# Patient Record
Sex: MALE | Race: WHITE | NOT HISPANIC OR LATINO | Employment: FULL TIME | URBAN - NONMETROPOLITAN AREA
[De-identification: names, ages, dates, MRNs, and addresses within clinical notes are randomized per-mention and may not be internally consistent; named-entity substitution may affect disease eponyms.]

---

## 2017-05-25 PROBLEM — N20.0 KIDNEY STONE: Status: ACTIVE | Noted: 2017-05-25

## 2017-05-25 PROBLEM — K21.9 GERD (GASTROESOPHAGEAL REFLUX DISEASE): Status: ACTIVE | Noted: 2017-05-25

## 2017-05-25 PROBLEM — R00.2 PALPITATIONS: Status: ACTIVE | Noted: 2017-05-25

## 2017-05-25 PROBLEM — Z95.5 H/O HEART ARTERY STENT: Status: ACTIVE | Noted: 2017-05-25

## 2017-05-25 PROBLEM — I25.10 CAD IN NATIVE ARTERY: Status: ACTIVE | Noted: 2017-05-25

## 2017-05-25 PROBLEM — I10 ESSENTIAL HYPERTENSION: Status: ACTIVE | Noted: 2017-05-25

## 2017-05-25 PROBLEM — E78.5 HYPERLIPIDEMIA, UNSPECIFIED: Status: ACTIVE | Noted: 2017-05-25

## 2017-05-25 RX ORDER — OMEPRAZOLE 20 MG/1
20 CAPSULE, DELAYED RELEASE ORAL DAILY
COMMUNITY

## 2017-05-25 RX ORDER — NITROGLYCERIN 0.4 MG/1
0.4 TABLET SUBLINGUAL
COMMUNITY
End: 2022-02-07 | Stop reason: SDUPTHER

## 2017-05-25 RX ORDER — SIMVASTATIN 80 MG
80 TABLET ORAL NIGHTLY
COMMUNITY
End: 2017-05-26

## 2017-05-25 RX ORDER — PRASUGREL 10 MG/1
10 TABLET, FILM COATED ORAL DAILY
COMMUNITY

## 2017-05-25 RX ORDER — LISINOPRIL 20 MG/1
20 TABLET ORAL 2 TIMES DAILY
COMMUNITY

## 2017-05-25 RX ORDER — ASPIRIN 81 MG/1
81 TABLET ORAL DAILY
COMMUNITY

## 2017-05-26 ENCOUNTER — OFFICE VISIT (OUTPATIENT)
Dept: CARDIOLOGY | Facility: CLINIC | Age: 52
End: 2017-05-26

## 2017-05-26 VITALS
HEART RATE: 59 BPM | WEIGHT: 243 LBS | BODY MASS INDEX: 32.91 KG/M2 | DIASTOLIC BLOOD PRESSURE: 80 MMHG | HEIGHT: 72 IN | SYSTOLIC BLOOD PRESSURE: 138 MMHG

## 2017-05-26 DIAGNOSIS — I25.10 CAD IN NATIVE ARTERY: Primary | ICD-10-CM

## 2017-05-26 DIAGNOSIS — I10 ESSENTIAL HYPERTENSION: ICD-10-CM

## 2017-05-26 PROCEDURE — 93000 ELECTROCARDIOGRAM COMPLETE: CPT | Performed by: INTERNAL MEDICINE

## 2017-05-26 PROCEDURE — 99213 OFFICE O/P EST LOW 20 MIN: CPT | Performed by: INTERNAL MEDICINE

## 2017-05-26 RX ORDER — BUSPIRONE HYDROCHLORIDE 15 MG/1
15 TABLET ORAL DAILY
COMMUNITY

## 2017-05-26 RX ORDER — FLUOXETINE HYDROCHLORIDE 40 MG/1
40 CAPSULE ORAL DAILY
COMMUNITY

## 2017-05-26 RX ORDER — ATORVASTATIN CALCIUM 80 MG/1
80 TABLET, FILM COATED ORAL DAILY
Qty: 30 TABLET | Refills: 11 | Status: SHIPPED | OUTPATIENT
Start: 2017-05-26 | End: 2018-05-02 | Stop reason: SDUPTHER

## 2017-05-26 RX ORDER — LOSARTAN POTASSIUM 50 MG/1
50 TABLET ORAL DAILY
COMMUNITY

## 2018-02-05 ENCOUNTER — TELEPHONE (OUTPATIENT)
Dept: CARDIOLOGY | Facility: CLINIC | Age: 53
End: 2018-02-05

## 2018-02-05 NOTE — TELEPHONE ENCOUNTER
PATIENT IS HAVING A DENTAL PROCEDURE AND IS NEEDING A RISK ASSESSMENT FOR BLOOD THINNER.  LOV=05/26/17

## 2018-05-02 DIAGNOSIS — I25.10 CAD IN NATIVE ARTERY: ICD-10-CM

## 2018-05-02 RX ORDER — ATORVASTATIN CALCIUM 80 MG/1
TABLET, FILM COATED ORAL
Qty: 30 TABLET | Refills: 0 | Status: SHIPPED | OUTPATIENT
Start: 2018-05-02 | End: 2018-06-15 | Stop reason: SDUPTHER

## 2018-06-15 DIAGNOSIS — I25.10 CAD IN NATIVE ARTERY: ICD-10-CM

## 2018-06-15 RX ORDER — ATORVASTATIN CALCIUM 80 MG/1
TABLET, FILM COATED ORAL
Qty: 30 TABLET | Refills: 5 | Status: SHIPPED | OUTPATIENT
Start: 2018-06-15 | End: 2018-11-26 | Stop reason: SDUPTHER

## 2018-11-26 DIAGNOSIS — I25.10 CAD IN NATIVE ARTERY: ICD-10-CM

## 2018-11-27 RX ORDER — ATORVASTATIN CALCIUM 80 MG/1
TABLET, FILM COATED ORAL
Qty: 30 TABLET | Refills: 11 | Status: SHIPPED | OUTPATIENT
Start: 2018-11-27 | End: 2021-12-07 | Stop reason: SDUPTHER

## 2021-12-07 ENCOUNTER — OFFICE VISIT (OUTPATIENT)
Dept: CARDIOLOGY | Facility: CLINIC | Age: 56
End: 2021-12-07

## 2021-12-07 VITALS
WEIGHT: 235 LBS | HEIGHT: 72 IN | DIASTOLIC BLOOD PRESSURE: 77 MMHG | SYSTOLIC BLOOD PRESSURE: 138 MMHG | BODY MASS INDEX: 31.83 KG/M2 | HEART RATE: 57 BPM

## 2021-12-07 DIAGNOSIS — R94.39 POSITIVE CARDIAC STRESS TEST: ICD-10-CM

## 2021-12-07 DIAGNOSIS — I10 ESSENTIAL HYPERTENSION: ICD-10-CM

## 2021-12-07 DIAGNOSIS — E78.5 HYPERLIPIDEMIA, UNSPECIFIED HYPERLIPIDEMIA TYPE: ICD-10-CM

## 2021-12-07 DIAGNOSIS — I25.10 CAD IN NATIVE ARTERY: ICD-10-CM

## 2021-12-07 DIAGNOSIS — I20.9 ANGINAL PAIN (HCC): Primary | ICD-10-CM

## 2021-12-07 DIAGNOSIS — R94.39 POSITIVE CARDIAC STRESS TEST: Primary | ICD-10-CM

## 2021-12-07 PROCEDURE — 93000 ELECTROCARDIOGRAM COMPLETE: CPT | Performed by: INTERNAL MEDICINE

## 2021-12-07 PROCEDURE — 99204 OFFICE O/P NEW MOD 45 MIN: CPT | Performed by: INTERNAL MEDICINE

## 2021-12-07 RX ORDER — ATORVASTATIN CALCIUM 80 MG/1
80 TABLET, FILM COATED ORAL DAILY
Qty: 90 TABLET | Refills: 3 | Status: SHIPPED | OUTPATIENT
Start: 2021-12-07 | End: 2022-02-07 | Stop reason: SINTOL

## 2021-12-07 RX ORDER — LORAZEPAM 2 MG/ML
1 INJECTION INTRAMUSCULAR ONCE
Status: CANCELLED | OUTPATIENT
Start: 2021-12-07 | End: 2021-12-07

## 2021-12-07 RX ORDER — NITROGLYCERIN 0.4 MG/1
0.4 TABLET SUBLINGUAL
Status: CANCELLED | OUTPATIENT
Start: 2021-12-07

## 2021-12-07 RX ORDER — ONDANSETRON 2 MG/ML
4 INJECTION INTRAMUSCULAR; INTRAVENOUS EVERY 6 HOURS PRN
Status: CANCELLED | OUTPATIENT
Start: 2021-12-07

## 2021-12-07 RX ORDER — SODIUM CHLORIDE 9 MG/ML
1-3 INJECTION, SOLUTION INTRAVENOUS CONTINUOUS
Status: CANCELLED | OUTPATIENT
Start: 2021-12-07

## 2021-12-07 RX ORDER — LIDOCAINE HYDROCHLORIDE 10 MG/ML
0.1 INJECTION, SOLUTION EPIDURAL; INFILTRATION; INTRACAUDAL; PERINEURAL ONCE AS NEEDED
Status: CANCELLED | OUTPATIENT
Start: 2021-12-07

## 2021-12-07 RX ORDER — NITROGLYCERIN 0.4 MG/1
TABLET SUBLINGUAL
Qty: 100 TABLET | Refills: 11 | Status: SHIPPED | OUTPATIENT
Start: 2021-12-07

## 2021-12-07 NOTE — PROGRESS NOTES
"Rubens Bagley is a 56 y.o. male. Referred by pcp for cp and Pos Stress nuc 8/17/21    History of Present Illness     CHEST PAIN:  This started 8/21 and was noted when walking up stairs at work. 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 In 8/21 he went to the [facility] ER with sscp and had a neg eval for ACS. He was referred for a stress NUC test and got into st 4 but has some infero-lateral ST depression and the Nuc scan showed inferior ischemia. He was scheduled to see Dr Strauss but contracted COVID and cancelled the pari. Recently he saw his pcp and was informed of the pos stress test and referred here as he has been here in the past. Since 8/31, he has had sscp only with walking stairs at work and he has learned how to \"pace\" himself to avoid cp. EKG today is [facility] ct 11/13/16.    HX LAD AND CX STENTING ('09 and '12):  His cp now is similar to the cp prior to stenting. \"I figure I've got a worse blockage now\".    HLD:  In the past he was on high-potent statin tx but has been off that for years  - \"the script was just never filled for some reason\". He has not had a recent lipid check.        The following portions of the patient's history were reviewed and updated as appropriate: allergies, current medications, past family history, past medical history, past social history, past surgical history and problem list.    Patient Active Problem List   Diagnosis   • Palpitations   • CAD in native artery   • Essential hypertension   • H/O heart artery stent   • GERD (gastroesophageal reflux disease)   • Kidney stone   • Hyperlipidemia, unspecified   • Positive cardiac stress test   • Anginal pain (HCC)       No Known Allergies    Family History   Problem Relation Age of Onset   • Heart attack Father    • Heart disease Father    • Cardiomyopathy Father    • Hypertension Sister    • No Known Problems Mother        Social History     Socioeconomic History   • Marital status:    Tobacco " Use   • Smoking status: Former Smoker     Quit date:      Years since quittin.9   • Smokeless tobacco: Former User     Quit date:    Substance and Sexual Activity   • Alcohol use: No   • Drug use: No         Current Outpatient Medications:   •  aspirin 81 MG EC tablet, Take 81 mg by mouth Daily., Disp: , Rfl:   •  busPIRone (BUSPAR) 15 MG tablet, Take 15 mg by mouth Daily., Disp: , Rfl:   •  FLUoxetine (PROZAC) 40 MG capsule, Take 40 mg by mouth Daily., Disp: , Rfl:   •  lisinopril (PRINIVIL,ZESTRIL) 10 MG tablet, Take 20 mg by mouth Daily., Disp: , Rfl:   •  losartan (COZAAR) 50 MG tablet, Take 50 mg by mouth Daily., Disp: , Rfl:   •  metoprolol tartrate (LOPRESSOR) 25 MG tablet, Take 25 mg by mouth 2 (Two) Times a Day., Disp: , Rfl:   •  nitroglycerin (NITROSTAT) 0.4 MG SL tablet, Place 0.4 mg under the tongue Every 5 (Five) Minutes As Needed for Chest Pain. Take no more than 3 doses in 15 minutes., Disp: , Rfl:   •  omeprazole (priLOSEC) 20 MG capsule, Take 20 mg by mouth Daily., Disp: , Rfl:   •  prasugrel (EFFIENT) 10 MG tablet, Take 10 mg by mouth Daily., Disp: , Rfl:   •  atorvastatin (LIPITOR) 80 MG tablet, Take 1 tablet by mouth Daily., Disp: 90 tablet, Rfl: 3  •  nitroglycerin (NITROSTAT) 0.4 MG SL tablet, 1 under the tongue as needed for angina, may repeat q5mins for up three doses, Disp: 100 tablet, Rfl: 11    Past Surgical History:   Procedure Laterality Date   • ANGIOPLASTY      with stents x 2   • CARDIAC CATHETERIZATION     • CORONARY STENT PLACEMENT     • OTHER SURGICAL HISTORY      Lithotripsy   • TONSILLECTOMY         Review of Systems   Constitutional: Positive for activity change and fatigue. Negative for appetite change and unexpected weight change.   Respiratory: Negative for shortness of breath.    Cardiovascular: Positive for chest pain. Negative for palpitations and leg swelling.   Gastrointestinal: Negative for abdominal pain.   Genitourinary: Negative for difficulty  "urinating.       /77   Pulse 57   Ht 182.9 cm (72\")   Wt 107 kg (235 lb)   BMI 31.87 kg/m²   Procedures    Objective   Physical Exam  Constitutional:       Appearance: Normal appearance. He is not ill-appearing.   Cardiovascular:      Rate and Rhythm: Normal rate and regular rhythm.      Pulses: Normal pulses.      Heart sounds: Normal heart sounds. No murmur heard.  No friction rub. No gallop.    Pulmonary:      Effort: Pulmonary effort is normal.      Breath sounds: Normal breath sounds. No wheezing or rales.   Abdominal:      General: Bowel sounds are normal.      Tenderness: There is no abdominal tenderness.   Musculoskeletal:      Right lower leg: No edema.      Left lower leg: No edema.   Skin:     General: Skin is warm and dry.   Neurological:      General: No focal deficit present.      Mental Status: He is oriented to person, place, and time.   Psychiatric:         Mood and Affect: Mood normal.         Behavior: Behavior normal.         Assessment/Plan   Diagnoses and all orders for this visit:    1. Anginal pain (HCC) (Primary)  Comments:  prn NTG and schedule cardiac cath - he desires after holidays    2. CAD in native artery  -     ECG 12 Lead  -     atorvastatin (LIPITOR) 80 MG tablet; Take 1 tablet by mouth Daily.  Dispense: 90 tablet; Refill: 3    3. Essential hypertension  Comments:  controlled    4. Hyperlipidemia, unspecified hyperlipidemia type  Comments:  restart statin    5. CAD in native artery  Comments:  no angina  Orders:  -     ECG 12 Lead  -     atorvastatin (LIPITOR) 80 MG tablet; Take 1 tablet by mouth Daily.  Dispense: 90 tablet; Refill: 3    6. Positive cardiac stress test  -     Case Request Cath Lab: Cardiac Catheterization/Vascular Study  -     nitroglycerin (NITROSTAT) 0.4 MG SL tablet; 1 under the tongue as needed for angina, may repeat q5mins for up three doses  Dispense: 100 tablet; Refill: 11                 Return for Next scheduled follow up TBD.  Orders Placed This " Encounter   Procedures   • ECG 12 Lead     Order Specific Question:   Reason for Exam:     Answer:   POSITIVE STRESS TEST     Order Specific Question:   Release to patient     Answer:   Immediate

## 2022-01-05 ENCOUNTER — HOSPITAL ENCOUNTER (OUTPATIENT)
Facility: HOSPITAL | Age: 57
Setting detail: HOSPITAL OUTPATIENT SURGERY
Discharge: HOME | End: 2022-01-05
Attending: INTERNAL MEDICINE | Admitting: INTERNAL MEDICINE

## 2022-01-05 VITALS
HEIGHT: 71 IN | HEART RATE: 62 BPM | TEMPERATURE: 98.2 F | SYSTOLIC BLOOD PRESSURE: 117 MMHG | WEIGHT: 228 LBS | OXYGEN SATURATION: 95 % | DIASTOLIC BLOOD PRESSURE: 84 MMHG | BODY MASS INDEX: 31.92 KG/M2 | RESPIRATION RATE: 16 BRPM

## 2022-01-05 DIAGNOSIS — R94.39 POSITIVE CARDIAC STRESS TEST: ICD-10-CM

## 2022-01-05 DIAGNOSIS — R00.2 PALPITATIONS: Primary | ICD-10-CM

## 2022-01-05 LAB
ALBUMIN SERPL-MCNC: 4.6 G/DL (ref 3.5–5.2)
ALBUMIN/GLOB SERPL: 1.6 G/DL
ALP SERPL-CCNC: 107 U/L (ref 39–117)
ALT SERPL W P-5'-P-CCNC: 24 U/L (ref 1–41)
ANION GAP SERPL CALCULATED.3IONS-SCNC: 8 MMOL/L (ref 5–15)
AST SERPL-CCNC: 21 U/L (ref 1–40)
BASOPHILS # BLD AUTO: 0.04 10*3/MM3 (ref 0–0.2)
BASOPHILS NFR BLD AUTO: 0.5 % (ref 0–1.5)
BILIRUB SERPL-MCNC: 0.4 MG/DL (ref 0–1.2)
BUN SERPL-MCNC: 19 MG/DL (ref 6–20)
BUN/CREAT SERPL: 18.4 (ref 7–25)
CALCIUM SPEC-SCNC: 9.6 MG/DL (ref 8.6–10.5)
CHLORIDE SERPL-SCNC: 108 MMOL/L (ref 98–107)
CO2 SERPL-SCNC: 26 MMOL/L (ref 22–29)
CREAT SERPL-MCNC: 1.03 MG/DL (ref 0.76–1.27)
DEPRECATED RDW RBC AUTO: 43.1 FL (ref 37–54)
EOSINOPHIL # BLD AUTO: 0.46 10*3/MM3 (ref 0–0.4)
EOSINOPHIL NFR BLD AUTO: 6.1 % (ref 0.3–6.2)
ERYTHROCYTE [DISTWIDTH] IN BLOOD BY AUTOMATED COUNT: 13.3 % (ref 12.3–15.4)
GFR SERPL CREATININE-BSD FRML MDRD: 74 ML/MIN/1.73
GLOBULIN UR ELPH-MCNC: 2.8 GM/DL
GLUCOSE SERPL-MCNC: 104 MG/DL (ref 65–99)
HCT VFR BLD AUTO: 44 % (ref 37.5–51)
HGB BLD-MCNC: 14 G/DL (ref 13–17.7)
IMM GRANULOCYTES # BLD AUTO: 0.02 10*3/MM3 (ref 0–0.05)
IMM GRANULOCYTES NFR BLD AUTO: 0.3 % (ref 0–0.5)
LYMPHOCYTES # BLD AUTO: 2.24 10*3/MM3 (ref 0.7–3.1)
LYMPHOCYTES NFR BLD AUTO: 29.5 % (ref 19.6–45.3)
MCH RBC QN AUTO: 28.3 PG (ref 26.6–33)
MCHC RBC AUTO-ENTMCNC: 31.8 G/DL (ref 31.5–35.7)
MCV RBC AUTO: 88.9 FL (ref 79–97)
MONOCYTES # BLD AUTO: 0.65 10*3/MM3 (ref 0.1–0.9)
MONOCYTES NFR BLD AUTO: 8.6 % (ref 5–12)
NEUTROPHILS NFR BLD AUTO: 4.18 10*3/MM3 (ref 1.7–7)
NEUTROPHILS NFR BLD AUTO: 55 % (ref 42.7–76)
NRBC BLD AUTO-RTO: 0 /100 WBC (ref 0–0.2)
PLATELET # BLD AUTO: 212 10*3/MM3 (ref 140–450)
PMV BLD AUTO: 10.7 FL (ref 6–12)
POTASSIUM SERPL-SCNC: 4.3 MMOL/L (ref 3.5–5.2)
PROT SERPL-MCNC: 7.4 G/DL (ref 6–8.5)
RBC # BLD AUTO: 4.95 10*6/MM3 (ref 4.14–5.8)
SODIUM SERPL-SCNC: 142 MMOL/L (ref 136–145)
WBC NRBC COR # BLD: 7.59 10*3/MM3 (ref 3.4–10.8)

## 2022-01-05 PROCEDURE — 93458 L HRT ARTERY/VENTRICLE ANGIO: CPT | Performed by: INTERNAL MEDICINE

## 2022-01-05 PROCEDURE — 25010000002 DIPHENHYDRAMINE PER 50 MG: Performed by: INTERNAL MEDICINE

## 2022-01-05 PROCEDURE — L1830 KO IMMOB CANVAS LONG PRE OTS: HCPCS | Performed by: INTERNAL MEDICINE

## 2022-01-05 PROCEDURE — 99152 MOD SED SAME PHYS/QHP 5/>YRS: CPT | Performed by: INTERNAL MEDICINE

## 2022-01-05 PROCEDURE — 25010000002 FENTANYL CITRATE (PF) 50 MCG/ML SOLUTION: Performed by: INTERNAL MEDICINE

## 2022-01-05 PROCEDURE — C1894 INTRO/SHEATH, NON-LASER: HCPCS | Performed by: INTERNAL MEDICINE

## 2022-01-05 PROCEDURE — 80053 COMPREHEN METABOLIC PANEL: CPT | Performed by: INTERNAL MEDICINE

## 2022-01-05 PROCEDURE — 0 IOPAMIDOL PER 1 ML: Performed by: INTERNAL MEDICINE

## 2022-01-05 PROCEDURE — 85025 COMPLETE CBC W/AUTO DIFF WBC: CPT | Performed by: INTERNAL MEDICINE

## 2022-01-05 PROCEDURE — C1769 GUIDE WIRE: HCPCS | Performed by: INTERNAL MEDICINE

## 2022-01-05 PROCEDURE — 25010000002 HEPARIN (PORCINE) 2000-0.9 UNIT/L-% SOLUTION: Performed by: INTERNAL MEDICINE

## 2022-01-05 PROCEDURE — 99153 MOD SED SAME PHYS/QHP EA: CPT | Performed by: INTERNAL MEDICINE

## 2022-01-05 PROCEDURE — 25010000002 MIDAZOLAM PER 1 MG: Performed by: INTERNAL MEDICINE

## 2022-01-05 PROCEDURE — 25010000002 HEPARIN (PORCINE) 1000-0.9 UT/500ML-% SOLUTION: Performed by: INTERNAL MEDICINE

## 2022-01-05 RX ORDER — SODIUM CHLORIDE 9 MG/ML
1-3 INJECTION, SOLUTION INTRAVENOUS CONTINUOUS
Status: DISCONTINUED | OUTPATIENT
Start: 2022-01-05 | End: 2022-01-05 | Stop reason: HOSPADM

## 2022-01-05 RX ORDER — HEPARIN SODIUM 200 [USP'U]/100ML
INJECTION, SOLUTION INTRAVENOUS AS NEEDED
Status: DISCONTINUED | OUTPATIENT
Start: 2022-01-05 | End: 2022-01-05 | Stop reason: HOSPADM

## 2022-01-05 RX ORDER — DIPHENHYDRAMINE HCL 25 MG
25 CAPSULE ORAL EVERY 6 HOURS PRN
Status: DISCONTINUED | OUTPATIENT
Start: 2022-01-05 | End: 2022-01-05 | Stop reason: HOSPADM

## 2022-01-05 RX ORDER — CALCIUM CARBONATE 200(500)MG
2 TABLET,CHEWABLE ORAL 2 TIMES DAILY PRN
Status: DISCONTINUED | OUTPATIENT
Start: 2022-01-05 | End: 2022-01-05 | Stop reason: HOSPADM

## 2022-01-05 RX ORDER — ONDANSETRON 4 MG/1
4 TABLET, FILM COATED ORAL EVERY 6 HOURS PRN
Status: DISCONTINUED | OUTPATIENT
Start: 2022-01-05 | End: 2022-01-05 | Stop reason: HOSPADM

## 2022-01-05 RX ORDER — MIDAZOLAM HYDROCHLORIDE 1 MG/ML
INJECTION INTRAMUSCULAR; INTRAVENOUS AS NEEDED
Status: DISCONTINUED | OUTPATIENT
Start: 2022-01-05 | End: 2022-01-05 | Stop reason: HOSPADM

## 2022-01-05 RX ORDER — LIDOCAINE HYDROCHLORIDE 20 MG/ML
INJECTION, SOLUTION INFILTRATION; PERINEURAL AS NEEDED
Status: DISCONTINUED | OUTPATIENT
Start: 2022-01-05 | End: 2022-01-05 | Stop reason: HOSPADM

## 2022-01-05 RX ORDER — FENTANYL CITRATE 50 UG/ML
INJECTION, SOLUTION INTRAMUSCULAR; INTRAVENOUS AS NEEDED
Status: DISCONTINUED | OUTPATIENT
Start: 2022-01-05 | End: 2022-01-05 | Stop reason: HOSPADM

## 2022-01-05 RX ORDER — SODIUM CHLORIDE 9 MG/ML
100 INJECTION, SOLUTION INTRAVENOUS CONTINUOUS
Status: DISCONTINUED | OUTPATIENT
Start: 2022-01-05 | End: 2022-01-05 | Stop reason: HOSPADM

## 2022-01-05 RX ORDER — DIPHENHYDRAMINE HYDROCHLORIDE 50 MG/ML
INJECTION INTRAMUSCULAR; INTRAVENOUS AS NEEDED
Status: DISCONTINUED | OUTPATIENT
Start: 2022-01-05 | End: 2022-01-05 | Stop reason: HOSPADM

## 2022-01-05 RX ORDER — ACETAMINOPHEN 325 MG/1
650 TABLET ORAL EVERY 4 HOURS PRN
Status: DISCONTINUED | OUTPATIENT
Start: 2022-01-05 | End: 2022-01-05 | Stop reason: HOSPADM

## 2022-01-05 RX ORDER — ASPIRIN 81 MG/1
81 TABLET ORAL DAILY
Status: DISCONTINUED | OUTPATIENT
Start: 2022-01-05 | End: 2022-01-05 | Stop reason: HOSPADM

## 2022-01-05 RX ORDER — SODIUM CHLORIDE 9 MG/ML
1 INJECTION, SOLUTION INTRAVENOUS CONTINUOUS
Status: DISCONTINUED | OUTPATIENT
Start: 2022-01-05 | End: 2022-01-05 | Stop reason: HOSPADM

## 2022-01-05 RX ORDER — ONDANSETRON 2 MG/ML
4 INJECTION INTRAMUSCULAR; INTRAVENOUS EVERY 6 HOURS PRN
Status: DISCONTINUED | OUTPATIENT
Start: 2022-01-05 | End: 2022-01-05 | Stop reason: HOSPADM

## 2022-01-05 RX ADMIN — SODIUM CHLORIDE 1 ML/KG/HR: 9 INJECTION, SOLUTION INTRAVENOUS at 12:08

## 2022-01-10 ENCOUNTER — APPOINTMENT (OUTPATIENT)
Dept: CARDIOLOGY | Facility: HOSPITAL | Age: 57
End: 2022-01-10

## 2022-01-11 ENCOUNTER — HOSPITAL ENCOUNTER (OUTPATIENT)
Dept: CARDIOLOGY | Facility: HOSPITAL | Age: 57
Discharge: HOME | End: 2022-01-11
Admitting: INTERNAL MEDICINE

## 2022-01-11 DIAGNOSIS — R00.2 PALPITATIONS: ICD-10-CM

## 2022-01-11 PROCEDURE — 93225 XTRNL ECG REC<48 HRS REC: CPT

## 2022-01-15 LAB
MAXIMAL PREDICTED HEART RATE: 162 BPM
STRESS TARGET HR: 138 BPM

## 2022-01-15 PROCEDURE — 93227 XTRNL ECG REC<48 HR R&I: CPT | Performed by: INTERNAL MEDICINE

## 2022-02-07 ENCOUNTER — OFFICE VISIT (OUTPATIENT)
Dept: CARDIOLOGY | Facility: CLINIC | Age: 57
End: 2022-02-07

## 2022-02-07 VITALS
WEIGHT: 230 LBS | SYSTOLIC BLOOD PRESSURE: 125 MMHG | BODY MASS INDEX: 32.2 KG/M2 | HEIGHT: 71 IN | OXYGEN SATURATION: 99 % | HEART RATE: 60 BPM | DIASTOLIC BLOOD PRESSURE: 80 MMHG | RESPIRATION RATE: 18 BRPM

## 2022-02-07 DIAGNOSIS — E78.5 HYPERLIPIDEMIA, UNSPECIFIED HYPERLIPIDEMIA TYPE: ICD-10-CM

## 2022-02-07 DIAGNOSIS — I10 ESSENTIAL HYPERTENSION: ICD-10-CM

## 2022-02-07 DIAGNOSIS — I49.3 PVC'S (PREMATURE VENTRICULAR CONTRACTIONS): Primary | ICD-10-CM

## 2022-02-07 DIAGNOSIS — I25.10 CAD IN NATIVE ARTERY: ICD-10-CM

## 2022-02-07 DIAGNOSIS — R00.2 PALPITATIONS: ICD-10-CM

## 2022-02-07 PROCEDURE — 99214 OFFICE O/P EST MOD 30 MIN: CPT | Performed by: NURSE PRACTITIONER

## 2022-02-07 RX ORDER — ATORVASTATIN CALCIUM 80 MG/1
80 TABLET, FILM COATED ORAL DAILY
Qty: 90 TABLET | Refills: 3 | Status: SHIPPED | OUTPATIENT
Start: 2022-02-07 | End: 2022-11-14

## 2022-02-07 RX ORDER — SIMVASTATIN 80 MG
80 TABLET ORAL NIGHTLY
COMMUNITY
End: 2022-02-07

## 2022-02-07 NOTE — PROGRESS NOTES
Subjective:     Encounter Date:02/07/2022      Patient ID: Jerry Bagley is a 56 y.o. male     Chief Complaint:  History of Present Illness  Patient presents today for follow up for chest pain and palpitations. Patient underwent heart cath for angina. Heart cath showed patent stent in proximal   LAD and no obstructive coronary artery disease. He did wear a holter monitor due to palpitations which showed roughly 10% PVCs. He notes he has daily palpitations. He notes he was to the understanding he would be referred to Dr. Soriano for this issue but has not been notified of appointment. Patient has hypertension, hyperlipidemia and GERD. He follows with Emily SANTOS for his primary care needs. He was started on Lipitor 80 at last OV but now is not on this medication- he is unclear as to why.     The following portions of the patient's history were reviewed and updated as appropriate: allergies, current medications, past medical history, past social history, past and problem list.    No Known Allergies    Current Outpatient Medications:   •  aspirin 81 MG EC tablet, Take 81 mg by mouth Daily., Disp: , Rfl:   •  busPIRone (BUSPAR) 15 MG tablet, Take 15 mg by mouth Daily., Disp: , Rfl:   •  FLUoxetine (PROZAC) 40 MG capsule, Take 40 mg by mouth Daily., Disp: , Rfl:   •  lisinopril (PRINIVIL,ZESTRIL) 20 MG tablet, Take 20 mg by mouth Daily., Disp: , Rfl:   •  losartan (COZAAR) 50 MG tablet, Take 50 mg by mouth Daily., Disp: , Rfl:   •  metoprolol tartrate (LOPRESSOR) 25 MG tablet, Take 25 mg by mouth 2 (Two) Times a Day., Disp: , Rfl:   •  nitroglycerin (NITROSTAT) 0.4 MG SL tablet, 1 under the tongue as needed for angina, may repeat q5mins for up three doses, Disp: 100 tablet, Rfl: 11  •  omeprazole (priLOSEC) 20 MG capsule, Take 20 mg by mouth Daily., Disp: , Rfl:   •  prasugrel (EFFIENT) 10 MG tablet, Take 10 mg by mouth Daily., Disp: , Rfl:   •  atorvastatin (LIPITOR) 80 MG tablet, Take 1 tablet by mouth Daily., Disp:  90 tablet, Rfl: 3    Social History     Socioeconomic History   • Marital status:    Tobacco Use   • Smoking status: Former Smoker     Quit date: 2007     Years since quitting: 15.1   • Smokeless tobacco: Former User     Quit date: 2007   Substance and Sexual Activity   • Alcohol use: No   • Drug use: No       Review of Systems   Constitutional: Positive for malaise/fatigue. Negative for chills, decreased appetite, fever, weight gain and weight loss.   HENT: Negative for nosebleeds.    Eyes: Negative for visual disturbance.   Cardiovascular: Positive for palpitations. Negative for chest pain, dyspnea on exertion, leg swelling, near-syncope, orthopnea, paroxysmal nocturnal dyspnea and syncope.   Respiratory: Negative for cough, hemoptysis, shortness of breath and snoring.    Endocrine: Negative for cold intolerance and heat intolerance.   Hematologic/Lymphatic: Negative for bleeding problem. Does not bruise/bleed easily.   Skin: Negative for rash.   Musculoskeletal: Negative for back pain and falls.   Gastrointestinal: Negative for abdominal pain, constipation, diarrhea, heartburn, melena, nausea and vomiting.   Genitourinary: Negative for hematuria.   Neurological: Negative for dizziness, headaches and light-headedness.   Psychiatric/Behavioral: Negative for altered mental status.   Allergic/Immunologic: Negative for persistent infections.              Objective:     Constitutional:       Appearance: Healthy appearance. Well-developed and not in distress.   Eyes:      Pupils: Pupils are equal, round, and reactive to light.   HENT:      Head: Normocephalic and atraumatic.   Neck:      Vascular: No carotid bruit or JVD.   Pulmonary:      Effort: Pulmonary effort is normal.      Breath sounds: Normal breath sounds.   Cardiovascular:      Normal rate. Regular rhythm.   Pulses:     Intact distal pulses.   Edema:     Peripheral edema absent.   Abdominal:      General: Bowel sounds are normal.      Palpations:  "Abdomen is soft.   Musculoskeletal: Normal range of motion.      Cervical back: Normal range of motion and neck supple. Skin:     General: Skin is warm and dry.   Neurological:      Mental Status: Alert and oriented to person, place, and time.      Deep Tendon Reflexes: Reflexes are normal and symmetric.   Psychiatric:         Behavior: Behavior normal.         Thought Content: Thought content normal.         Judgment: Judgment normal.           Procedures  /80 (BP Location: Right arm, Patient Position: Sitting, Cuff Size: Adult)   Pulse 60   Resp 18   Ht 180.3 cm (71\")   Wt 104 kg (230 lb)   SpO2 99%   BMI 32.08 kg/m²   Lab Review:   I have reviewed   Heart cath  Holter Monitor       No results found for: CHOL, CHLPL, TRIG, HDL, LDL, LDLDIRECT   Results for orders placed in visit on 01/19/16    SCANNED - ECHOCARDIOGRAM     Assessment:          Diagnosis Plan   1. PVC's (premature ventricular contractions)  Ambulatory Referral to Cardiac Electrophysiology   2. Palpitations     3. CAD in native artery     4. Essential hypertension     5. Hyperlipidemia, unspecified hyperlipidemia type     6. BMI 32.0-32.9,adult            Plan:       1. PVCs - 10.9% burden on  Monitor. Daily palpitations. No associated symptoms. On Lopressor. Will refer to electrophysiology per Dr. Dee  2. Palpitations - chronic. Occur daily. On Lopressor. Refer to EP  3. Coronary Artery Disease - Patent stent on recent cath. Chest pain has resolved. Needs high intensity statin ( Lipitor or Crestor). Will send in Lipitor 80 as Dr. Dee did before. On Effient and Aspirin.   4. Hypertension - blood pressure stable and controlled.   5. Hyperlipidemia - goal LDL less than 80. Have requested lipid panel. Given CAD needs to be in high intensity statin.   6. Patient's Body mass index is 32.08 kg/m². indicating that he is obese (BMI >30). Obesity-related health conditions include the following: hypertension, coronary heart disease and " dyslipidemias. Obesity is unchanged. BMI is is above average; no BMI management plan is appropriate. We discussed portion control and increasing exercise..           I spent 32 minutes caring for Jerry on this date of service. This time includes time spent by me in the following activities:preparing for the visit, reviewing tests, obtaining and/or reviewing a separately obtained history, performing a medically appropriate examination and/or evaluation , counseling and educating the patient/family/caregiver, ordering medications, tests, or procedures, referring and communicating with other health care professionals , documenting information in the medical record, independently interpreting results and communicating that information with the patient/family/caregiver and care coordination

## 2022-02-17 DIAGNOSIS — E78.5 HYPERLIPIDEMIA, UNSPECIFIED HYPERLIPIDEMIA TYPE: Primary | ICD-10-CM

## 2022-02-17 DIAGNOSIS — I25.10 CAD IN NATIVE ARTERY: ICD-10-CM

## 2022-03-21 PROBLEM — R94.39 POSITIVE CARDIAC STRESS TEST: Status: RESOLVED | Noted: 2021-12-07 | Resolved: 2022-03-21

## 2022-03-21 PROCEDURE — 93000 ELECTROCARDIOGRAM COMPLETE: CPT | Performed by: INTERNAL MEDICINE

## 2022-03-21 NOTE — PROGRESS NOTES
"        Cardiac Electrophysiology Outpatient Consult Note            [facility]    Consult Note     Jerry Bagley  [id number]  03/22/2022  [phone]     Primary Care Physician: Emily Lozano APRN    Referred By: Jostin Skaggs APRN    Subjective     Chief Complaint:   Diagnoses and all orders for this visit:    1. Palpitations (Primary)  -     ECG 12 Lead      Chief Complaint   Patient presents with   • PVC'S     CONSULT       History of Present Illness:   Jerry Bagley is a 56 y.o. male who presents to my electrophysiology clinic for evaluation of palpitations with high PVC burden.  He has a history of MI approximately 10 years ago since which he has had chronic, recurrent, random episodes of palpitations and presyncope.  He had a remote event monitor which is not currently available for review but he was told by previous cardiology that it was acceptable and he should \"get used to it\".  More recently he presented to primary cardiology with a complaint of exertional dyspnea and chest discomfort. He underwent cardiac catheterization which revealed nonobstructive CAD.  He also had a 48-hour Holter monitor which returned showing a 10.91% PVC burden.  There were 2 patient triggered events which correlate to sinus rhythm with artifact.  Based on these findings he was referred to our service for evaluation.  He describes random episodes of lightheadedness and dizziness which have not changed in intensity or frequency in the past 10 years.  These usually occur with exertion.  He states he will hold onto an object or wall briefly until symptoms pass and then proceed about his business.  He is never had adarsh syncope.  He has checked his blood pressure during 1 of these episodes before and states that it was elevated and that his heart rate almost never changes.  He checks his blood pressure regularly at home and it typically runs about 120 mmHg systolic.  His last " general cardiology note was reviewed which reviews the above complaints.  Out patient medications on that and today's list show that he is taking both losartan 50 mg daily and lisinopril 20 mg daily.  Patient confirms that this is correct.  His physical exam was unremarkable.  His vital signs were stable and consistent with today's findings.  He denies orthopnea, PND, claudication, lower extremity edema.  He has no history of congestive heart failure, TIA or CVA.  He quit smoking 9 years ago.  He states that he is compliant with his current medical regimen.    Past Medical History:   Patient Active Problem List    Diagnosis Date Noted   • Near syncope 03/22/2022   • PVC's (premature ventricular contractions) 02/07/2022     Note Last Updated: 3/21/2022     · 48 -hour Holter monitor initiated 1/11/2022: PVC burden 10.91% 2 morphologies     • Anginal pain (HCC) 12/07/2021   • Palpitations 05/25/2017     Note Last Updated: 3/21/2022     · Echocardiogram, 1/19/2016: EF 50-55%.  Left atrium mildly dilated normal valvular morphology     • CAD in native artery 05/25/2017     Note Last Updated: 3/21/2022     · LHC, 1/5/2022: EF 60%.  Widely patent stent in proximal LAD with 40% plaque distally.  Left circumflex distal 40% plaque.  RCA free of disease     • Essential hypertension 05/25/2017   • GERD (gastroesophageal reflux disease) 05/25/2017   • Kidney stone 05/25/2017   • Hyperlipidemia, unspecified 05/25/2017       Past Surgical History:   Past Surgical History:   Procedure Laterality Date   • ANGIOPLASTY      with stents x 2   • CARDIAC CATHETERIZATION     • CARDIAC CATHETERIZATION Left 1/5/2022    Procedure: Cardiac Catheterization/Vascular Study;  Surgeon: Gumaro Dee MD;  Location:  [facility] CATH INVASIVE LOCATION;  Service: Cardiology;  Laterality: Left;  1300 SLOT   • CORONARY STENT PLACEMENT     • OTHER SURGICAL HISTORY      Lithotripsy   • TONSILLECTOMY         Social History:   Social History  "    Socioeconomic History   • Marital status:    Tobacco Use   • Smoking status: Former Smoker     Years: 32.00     Quit date: 2007     Years since quitting: 15.2   • Smokeless tobacco: Former User     Quit date: 2007   Substance and Sexual Activity   • Alcohol use: No   • Drug use: No       Medications:     Current Outpatient Medications:   •  aspirin 81 MG EC tablet, Take 81 mg by mouth Daily., Disp: , Rfl:   •  atorvastatin (LIPITOR) 80 MG tablet, Take 1 tablet by mouth Daily., Disp: 90 tablet, Rfl: 3  •  busPIRone (BUSPAR) 15 MG tablet, Take 15 mg by mouth Daily., Disp: , Rfl:   •  FLUoxetine (PROzac) 40 MG capsule, Take 40 mg by mouth Daily., Disp: , Rfl:   •  lisinopril (PRINIVIL,ZESTRIL) 20 MG tablet, Take 20 mg by mouth 2 (Two) Times a Day., Disp: , Rfl:   •  losartan (COZAAR) 50 MG tablet, Take 50 mg by mouth Daily., Disp: , Rfl:   •  metoprolol tartrate (LOPRESSOR) 25 MG tablet, Take 25 mg by mouth 2 (Two) Times a Day., Disp: , Rfl:   •  nitroglycerin (NITROSTAT) 0.4 MG SL tablet, 1 under the tongue as needed for angina, may repeat q5mins for up three doses, Disp: 100 tablet, Rfl: 11  •  omeprazole (priLOSEC) 20 MG capsule, Take 20 mg by mouth Daily., Disp: , Rfl:   •  prasugrel (EFFIENT) 10 MG tablet, Take 10 mg by mouth Daily., Disp: , Rfl:     Allergies:   No Known Allergies    Objective   Vital Signs:   Vitals:    03/22/22 0951   BP: 120/84   BP Location: Left arm   Patient Position: Sitting   Pulse: 57   SpO2: 98%   Weight: 106 kg (234 lb)   Height: 182.9 cm (72\")       PHYSICAL EXAM  General appearance: Awake, alert, cooperative  Head: Normocephalic, without obvious abnormality, atraumatic  Eyes: Conjunctivae/corneas clear, EOMs intact  Neck: no adenopathy, no carotid bruit, no JVD and thyroid: not enlarged  Lungs: clear to auscultation bilaterally and no rhonchi or crackles\", ' symmetric  Heart: regular rate and rhythm, S1, S2 normal, no murmur, click, rub or gallop  Abdomen: Soft, " non-tender, bowel sounds normal,  no organomegaly  Extremities: extremities normal, atraumatic, no cyanosis or edema  Skin: Skin color, turgor normal, no rashes or lesions  Neurologic: Grossly normal     Lab Results   Component Value Date    GLUCOSE 104 (H) 01/05/2022    CALCIUM 9.6 01/05/2022     01/05/2022    K 4.3 01/05/2022    CO2 26.0 01/05/2022     (H) 01/05/2022    BUN 19 01/05/2022    CREATININE 1.03 01/05/2022    EGFRIFNONA 74 01/05/2022    BCR 18.4 01/05/2022    ANIONGAP 8.0 01/05/2022     Lab Results   Component Value Date    WBC 7.59 01/05/2022    HGB 14.0 01/05/2022    HCT 44.0 01/05/2022    MCV 88.9 01/05/2022     01/05/2022       Cardiac Testing:      I personally viewed and interpreted the patient's EKG/Telemetry/lab data      ECG 12 Lead    Date/Time: 3/21/2022 4:35 PM  Performed by: Rashaad Sears DO  Authorized by: Rashaad Sears DO   Previous ECG: no previous ECG available  Rhythm: sinus rhythm and sinus bradycardia  Rate: normal  BPM: 57  Conduction: conduction normal  ST Segments: ST segments normal  T Waves: T waves normal  QRS axis: normal  Other: no other findings    Clinical impression: normal ECG            Tobacco Cessation: N/A  Obstructive Sleep Apnea Screening: N/A    Assessment & Plan    Diagnoses and all orders for this visit:    1. Palpitations (Primary)  -     ECG 12 Lead         Diagnosis Plan   1. Palpitations   noncardiac complications.  On ambulatory with monitored with careful review this clearly only with normal sinus rhythm.    While history of that he does have a PVC burden of 10% with 1 predominant morphology and a second minor 1 we do not correlate at any time with the symptoms.  He really recalls menarche monitor back in January and at that time he is clear when he felt better when he did not.  I offered her the option of repeat ambulatory 30-day Holter monitor and event recorder essentially however he declined this.  He is sure of the result.    No  syncope.    Normal ejection fraction.    Normal EKG.    Prior history of obstructive coronary disease altogether I find no excess risk of morbidity mortality because of his palpitations or the unrelated asymptomatic PVCs.    Continue beta-blockade.  No role for antiarrhythmic drugs.    No further testing or work-up required.  My opinion he would not need benefit from cath ablation.    Back and see me as needed.     Body mass index is 31.74 kg/m².    I spent 47 minutes in consultation with this patient which included more than 65% of this time in direct face-to-face counseling, physical examination and discussion of my assessment and findings and shared decision making with the patient.  The remainder of the time not spent face to face was performing one, some or all of the following actions:  preparing to see this patient ( eg. Review of tests),  ordering medications, tests or procedures ), care coordination, discussion of the plan with other healthcare providers, documenting clinical information in Epic well as independently interpreting results and communicating results to patient, family and or caregiver.  All time noted occurred on the date of service.    Follow Up:       Thank you for allowing me to participate in the care of your patient. Please to not hesitate to contact me with additional questions or concerns.      Rashaad Sears DO, FACC, RS  Cardiac Electrophysiologist  [facility]    Scribed for Rashaad Sears DO Electronically signed by BELL Chanel, 03/22/22, 10:46 AM EDT.

## 2022-03-22 ENCOUNTER — OFFICE VISIT (OUTPATIENT)
Dept: CARDIOLOGY | Facility: CLINIC | Age: 57
End: 2022-03-22

## 2022-03-22 VITALS
DIASTOLIC BLOOD PRESSURE: 84 MMHG | BODY MASS INDEX: 31.69 KG/M2 | HEIGHT: 72 IN | HEART RATE: 57 BPM | WEIGHT: 234 LBS | OXYGEN SATURATION: 98 % | SYSTOLIC BLOOD PRESSURE: 120 MMHG

## 2022-03-22 DIAGNOSIS — R00.2 PALPITATIONS: Primary | ICD-10-CM

## 2022-03-22 PROBLEM — R55 NEAR SYNCOPE: Status: ACTIVE | Noted: 2022-03-22

## 2022-03-22 PROCEDURE — 99204 OFFICE O/P NEW MOD 45 MIN: CPT | Performed by: INTERNAL MEDICINE

## 2022-06-08 ENCOUNTER — TELEPHONE (OUTPATIENT)
Dept: CARDIOLOGY | Facility: CLINIC | Age: 57
End: 2022-06-08

## 2022-06-08 NOTE — TELEPHONE ENCOUNTER
Mr Kevyn has been reminded multiple times to have his lipid panel drawn. Every time I speak with him he tells me he will have it drawn here at [facility] and I have mailed him a order if he decided to have it drawn somewhere else.This has been going on for months and he still has not had it drawn.

## 2022-08-12 ENCOUNTER — TELEPHONE (OUTPATIENT)
Dept: CARDIOLOGY | Facility: CLINIC | Age: 57
End: 2022-08-12

## 2022-11-14 RX ORDER — ATORVASTATIN CALCIUM 80 MG/1
TABLET, FILM COATED ORAL
Qty: 30 TABLET | Refills: 0 | Status: SHIPPED | OUTPATIENT
Start: 2022-11-14

## 2022-11-14 NOTE — TELEPHONE ENCOUNTER
VM HAS BEEN LEFT FOR PATIENT TO RETURN CALL AND SCHEDULE A F/U APPOINTMENT.  REMINDER FOR LIPID PANEL AS WELL.

## 2023-03-22 RX ORDER — ATORVASTATIN CALCIUM 80 MG/1
TABLET, FILM COATED ORAL
OUTPATIENT
Start: 2023-03-22

## 2023-05-01 ENCOUNTER — OFFICE VISIT (OUTPATIENT)
Dept: CARDIOLOGY | Facility: CLINIC | Age: 58
End: 2023-05-01
Payer: COMMERCIAL

## 2023-05-01 VITALS
HEIGHT: 72 IN | OXYGEN SATURATION: 97 % | HEART RATE: 50 BPM | BODY MASS INDEX: 32.1 KG/M2 | WEIGHT: 237 LBS | DIASTOLIC BLOOD PRESSURE: 82 MMHG | SYSTOLIC BLOOD PRESSURE: 140 MMHG | RESPIRATION RATE: 18 BRPM

## 2023-05-01 DIAGNOSIS — R00.2 PALPITATIONS: ICD-10-CM

## 2023-05-01 DIAGNOSIS — I10 ESSENTIAL HYPERTENSION: Primary | ICD-10-CM

## 2023-05-01 DIAGNOSIS — I25.10 CAD IN NATIVE ARTERY: ICD-10-CM

## 2023-05-01 PROCEDURE — 1160F RVW MEDS BY RX/DR IN RCRD: CPT | Performed by: INTERNAL MEDICINE

## 2023-05-01 PROCEDURE — 3077F SYST BP >= 140 MM HG: CPT | Performed by: INTERNAL MEDICINE

## 2023-05-01 PROCEDURE — 3079F DIAST BP 80-89 MM HG: CPT | Performed by: INTERNAL MEDICINE

## 2023-05-01 PROCEDURE — 1159F MED LIST DOCD IN RCRD: CPT | Performed by: INTERNAL MEDICINE

## 2023-05-01 PROCEDURE — 93000 ELECTROCARDIOGRAM COMPLETE: CPT | Performed by: INTERNAL MEDICINE

## 2023-05-01 PROCEDURE — 99214 OFFICE O/P EST MOD 30 MIN: CPT | Performed by: INTERNAL MEDICINE

## 2023-05-01 RX ORDER — CARVEDILOL 25 MG/1
25 TABLET ORAL 2 TIMES DAILY
Qty: 60 TABLET | Refills: 11 | Status: SHIPPED | OUTPATIENT
Start: 2023-05-01

## 2023-05-01 RX ORDER — CLONIDINE HYDROCHLORIDE 0.1 MG/1
0.1 TABLET ORAL 2 TIMES DAILY
COMMUNITY

## 2023-05-01 NOTE — PROGRESS NOTES
"Subjective    Jerry Bagley is a 57 y.o. male. Fu of rhythm, ihd and risks    History of Present Illness     IHD:  Has stable stamina and no cp or unusual soa. EKG today is nsc. Meds are stable. Remote stent to the LAD but cath 22 shows no significant occlusions and normal LVEF and LVEDP.    HTN:  Has not been doing home bp checks until recently. Previous office checks were \"good\".  He has a DOT PE coming up so started checking home bp 2 weeks ago and found to very elevated and has been having to take his Clonidine dose bid and that was previously used just prn to keep it  <180/110.  Labs with pcp were \"ok\".    HLD:  Is on a potent statin and checks with his pcp are \"good\".    PVC's:   Is not being bothered by palpitations are this time. Had a monitor with 10% burden noted on 1/15/22 and was seen by EP and not felt to need an ablation or AAD. Has no syncope or light-headedness.         The following portions of the patient's history were reviewed and updated as appropriate: allergies, current medications, past family history, past medical history, past social history, past surgical history and problem list.    Patient Active Problem List   Diagnosis   • Palpitations   • CAD in native artery   • Essential hypertension   • GERD (gastroesophageal reflux disease)   • Kidney stone   • Hyperlipidemia, unspecified   • Anginal pain   • PVC's (premature ventricular contractions)   • Near syncope       No Known Allergies    Family History   Problem Relation Age of Onset   • Heart attack Father    • Heart disease Father    • Cardiomyopathy Father    • Hypertension Sister    • No Known Problems Mother        Social History     Socioeconomic History   • Marital status:    Tobacco Use   • Smoking status: Former     Years: 32.00     Types: Cigarettes     Quit date:      Years since quittin.3   • Smokeless tobacco: Former     Quit date:    Substance and Sexual Activity   • Alcohol use: No   • Drug use: " No         Current Outpatient Medications:   •  aspirin 81 MG EC tablet, Take 1 tablet by mouth Daily., Disp: , Rfl:   •  atorvastatin (LIPITOR) 80 MG tablet, TAKE 1 TABLET BY MOUTH ONCE DAILY, Disp: 30 tablet, Rfl: 0  •  busPIRone (BUSPAR) 15 MG tablet, Take 1 tablet by mouth Daily., Disp: , Rfl:   •  cloNIDine (CATAPRES) 0.1 MG tablet, Take 1 tablet by mouth 2 (Two) Times a Day., Disp: , Rfl:   •  FLUoxetine (PROzac) 40 MG capsule, Take 1 capsule by mouth Daily., Disp: , Rfl:   •  lisinopril (PRINIVIL,ZESTRIL) 20 MG tablet, Take 1 tablet by mouth 2 (Two) Times a Day., Disp: , Rfl:   •  losartan (COZAAR) 50 MG tablet, Take 1 tablet by mouth Daily., Disp: , Rfl:   •  nitroglycerin (NITROSTAT) 0.4 MG SL tablet, 1 under the tongue as needed for angina, may repeat q5mins for up three doses, Disp: 100 tablet, Rfl: 11  •  omeprazole (priLOSEC) 20 MG capsule, Take 1 capsule by mouth Daily., Disp: , Rfl:   •  prasugrel (EFFIENT) 10 MG tablet, Take 1 tablet by mouth Daily., Disp: , Rfl:   •  carvedilol (COREG) 25 MG tablet, Take 1 tablet by mouth 2 (Two) Times a Day., Disp: 60 tablet, Rfl: 11    Past Surgical History:   Procedure Laterality Date   • ANGIOPLASTY      with stents x 2   • CARDIAC CATHETERIZATION     • CARDIAC CATHETERIZATION Left 1/5/2022    Procedure: Cardiac Catheterization/Vascular Study;  Surgeon: Gumaro Dee MD;  Location:  [facility];  Service: Cardiology;  Laterality: Left;  1300 SLOT   • CORONARY STENT PLACEMENT     • OTHER SURGICAL HISTORY      Lithotripsy   • TONSILLECTOMY         Review of Systems   Constitutional: Negative for activity change, appetite change, fatigue and unexpected weight change.   Respiratory: Negative for shortness of breath.    Cardiovascular: Negative for chest pain, palpitations and leg swelling.   Gastrointestinal: Negative for abdominal pain and blood in stool.   Genitourinary: Negative for difficulty urinating and hematuria.  "  Musculoskeletal: Negative for arthralgias and back pain.   Neurological: Positive for numbness. Negative for syncope and weakness.        Chronic tingling in left arm and entire left hand that is not exertional or positional.   Psychiatric/Behavioral: Negative for sleep disturbance.       /82 (BP Location: Right arm, Patient Position: Sitting)   Pulse 50   Resp 18   Ht 182.9 cm (72\")   Wt 108 kg (237 lb)   SpO2 97%   BMI 32.14 kg/m²   Procedures    Objective   Physical Exam  Constitutional:       Appearance: He is obese.   Cardiovascular:      Rate and Rhythm: Normal rate and regular rhythm.      Pulses: Normal pulses.      Heart sounds: Normal heart sounds. No murmur heard.    No friction rub. No gallop.   Pulmonary:      Breath sounds: No wheezing or rales.   Abdominal:      General: Bowel sounds are normal.      Tenderness: There is no abdominal tenderness.   Musculoskeletal:      Right lower leg: No edema.      Left lower leg: No edema.   Skin:     General: Skin is warm.   Neurological:      General: No focal deficit present.   Psychiatric:         Mood and Affect: Mood normal.         Assessment & Plan   Diagnoses and all orders for this visit:    1. Essential hypertension (Primary)  Comments:  change diet to DASH, add beet juice 6 oz bid, check ECHO. change Metoprolol to Carvediolol  Orders:  -     Adult Transthoracic Echo Complete W/ Cont if Necessary Per Protocol  -     carvedilol (COREG) 25 MG tablet; Take 1 tablet by mouth 2 (Two) Times a Day.  Dispense: 60 tablet; Refill: 11    2. CAD in native artery  Comments:  no angina  Orders:  -     ECG 12 Lead    3. Palpitations  Comments:  controlled with BB      Mdm=mod - chronic illnesses and recently elevated bp with diet education and prescription drug management           Return in about 3 months (around 8/1/2023) for Next scheduled follow up.  Orders Placed This Encounter   Procedures   • ECG 12 Lead     Order Specific Question:   Reason for " Exam:     Answer:   Pericardial Effusion     Order Specific Question:   Release to patient     Answer:   Routine Release   • Adult Transthoracic Echo Complete W/ Cont if Necessary Per Protocol     Order Specific Question:   Reason for exam?     Answer:   Other Reasons     Order Specific Question:   Other reason(s)?     Answer:   Additional Reasons     Order Specific Question:   Additional reason(s)?     Answer:   Cardiac Mass

## 2023-05-18 ENCOUNTER — HOSPITAL ENCOUNTER (OUTPATIENT)
Dept: CARDIOLOGY | Facility: HOSPITAL | Age: 58
Discharge: HOME | End: 2023-05-18
Admitting: INTERNAL MEDICINE
Payer: COMMERCIAL

## 2023-05-18 PROCEDURE — 93306 TTE W/DOPPLER COMPLETE: CPT | Performed by: INTERNAL MEDICINE

## 2023-05-18 PROCEDURE — 93306 TTE W/DOPPLER COMPLETE: CPT

## 2023-05-21 LAB
BH CV ECHO MEAS - AO MAX PG: 6.9 MMHG
BH CV ECHO MEAS - AO MEAN PG: 4 MMHG
BH CV ECHO MEAS - AO ROOT DIAM: 4 CM
BH CV ECHO MEAS - AO V2 MAX: 131 CM/SEC
BH CV ECHO MEAS - AO V2 VTI: 32.9 CM
BH CV ECHO MEAS - AVA(I,D): 1.74 CM2
BH CV ECHO MEAS - EDV(CUBED): 148.9 ML
BH CV ECHO MEAS - EDV(MOD-SP4): 90 ML
BH CV ECHO MEAS - EF(MOD-SP4): 61.1 %
BH CV ECHO MEAS - ESV(CUBED): 54.9 ML
BH CV ECHO MEAS - ESV(MOD-SP4): 35 ML
BH CV ECHO MEAS - FS: 28.3 %
BH CV ECHO MEAS - IVS/LVPW: 1.2 CM
BH CV ECHO MEAS - IVSD: 1.2 CM
BH CV ECHO MEAS - LA DIMENSION: 5 CM
BH CV ECHO MEAS - LV DIASTOLIC VOL/BSA (35-75): 39.3 CM2
BH CV ECHO MEAS - LV MASS(C)D: 227.7 GRAMS
BH CV ECHO MEAS - LV MAX PG: 2.09 MMHG
BH CV ECHO MEAS - LV MEAN PG: 1 MMHG
BH CV ECHO MEAS - LV SYSTOLIC VOL/BSA (12-30): 15.3 CM2
BH CV ECHO MEAS - LV V1 MAX: 72.2 CM/SEC
BH CV ECHO MEAS - LV V1 VTI: 18.2 CM
BH CV ECHO MEAS - LVIDD: 5.3 CM
BH CV ECHO MEAS - LVIDS: 3.8 CM
BH CV ECHO MEAS - LVOT AREA: 3.1 CM2
BH CV ECHO MEAS - LVOT DIAM: 2 CM
BH CV ECHO MEAS - LVPWD: 1 CM
BH CV ECHO MEAS - MV A MAX VEL: 70.6 CM/SEC
BH CV ECHO MEAS - MV DEC SLOPE: 359 CM/SEC2
BH CV ECHO MEAS - MV DEC TIME: 0.13 MSEC
BH CV ECHO MEAS - MV E MAX VEL: 81.4 CM/SEC
BH CV ECHO MEAS - MV E/A: 1.15
BH CV ECHO MEAS - MV P1/2T: 78 MSEC
BH CV ECHO MEAS - MVA(P1/2T): 2.8 CM2
BH CV ECHO MEAS - PA V2 MAX: 80.1 CM/SEC
BH CV ECHO MEAS - PI END-D VEL: 140 CM/SEC
BH CV ECHO MEAS - RAP SYSTOLE: 5 MMHG
BH CV ECHO MEAS - RVSP: 39.1 MMHG
BH CV ECHO MEAS - SI(MOD-SP4): 24 ML/M2
BH CV ECHO MEAS - SV(LVOT): 57.2 ML
BH CV ECHO MEAS - SV(MOD-SP4): 55 ML
BH CV ECHO MEAS - TR MAX PG: 34.1 MMHG
BH CV ECHO MEAS - TR MAX VEL: 292 CM/SEC
LEFT ATRIUM VOLUME INDEX: 46.7 ML/M2

## 2025-03-20 ENCOUNTER — TELEPHONE (OUTPATIENT)
Dept: CARDIOLOGY | Facility: CLINIC | Age: 60
End: 2025-03-20
Payer: COMMERCIAL

## 2025-03-20 NOTE — TELEPHONE ENCOUNTER
Caller: Jerry Bagley    Relationship to patient: Self    Best call back number: [phone]     Chief complaint: RECENT HEART ATTACK     Type of visit: HOSPITAL FOLLOW UP     Requested date: SOON    If rescheduling, when is the original appointment:      Additional notes: PATIENT WAS IN [facility] AND THEN AIR LIFTED TO [facility] IN [place]. FORMER PATIENT OF DR. PHILLIPS (5.1.23) AND WANTS TO SEE DR. SCHWARTZ. PATIENT HAD A BRAIN BLEED AND WAS TOOK OFF BLOOD THINNERS AND THEN HAD THE HEART ATTACK.

## 2025-04-03 ENCOUNTER — TELEPHONE (OUTPATIENT)
Age: 60
End: 2025-04-03

## 2025-04-03 ENCOUNTER — HOSPITAL ENCOUNTER (EMERGENCY)
Facility: HOSPITAL | Age: 60
Discharge: HOME | End: 2025-04-03
Attending: EMERGENCY MEDICINE
Payer: COMMERCIAL

## 2025-04-03 ENCOUNTER — APPOINTMENT (OUTPATIENT)
Dept: GENERAL RADIOLOGY | Facility: HOSPITAL | Age: 60
End: 2025-04-03
Payer: COMMERCIAL

## 2025-04-03 VITALS
HEIGHT: 72 IN | OXYGEN SATURATION: 98 % | DIASTOLIC BLOOD PRESSURE: 84 MMHG | TEMPERATURE: 97.9 F | SYSTOLIC BLOOD PRESSURE: 119 MMHG | HEART RATE: 76 BPM | BODY MASS INDEX: 26.95 KG/M2 | WEIGHT: 199 LBS | RESPIRATION RATE: 16 BRPM

## 2025-04-03 DIAGNOSIS — S82.892A CLOSED FRACTURE OF LEFT ANKLE, INITIAL ENCOUNTER: Primary | ICD-10-CM

## 2025-04-03 PROCEDURE — 73610 X-RAY EXAM OF ANKLE: CPT

## 2025-04-03 PROCEDURE — 99283 EMERGENCY DEPT VISIT LOW MDM: CPT

## 2025-04-03 NOTE — ED PROVIDER NOTES
Subjective   History of Present Illness  Patient is a 59 years old who was recently admitted to an outlying facility for fall after getting dehydrated.  The patient was admitted to the ICU given IV fluids and worked up.  He also has sustained an injury to his left ankle for which she was placed in a postoperative boot.  He was supposed to see orthopedics but never got to see orthopedics he was discharged from the hospital today and came to our ER to be seen by orthopedics.  Denies any other complaints no chest pain no shortness of breath no fever.  His only reason for being over here is an orthopedic consultation    Ankle Injury  Location:  Left ankle injury  Severity:  Moderate  Onset quality:  Sudden  Duration: Several days ago.  Timing:  Constant  Chronicity:  New  Associated symptoms: no abdominal pain, no chest pain, no congestion, no cough, no diarrhea, no fatigue, no fever, no headaches, no loss of consciousness, no nausea, no rhinorrhea, no shortness of breath and no vomiting        Review of Systems   Constitutional: Negative.  Negative for chills, fatigue and fever.   HENT: Negative.  Negative for congestion and rhinorrhea.    Respiratory: Negative.  Negative for cough, chest tightness, shortness of breath and stridor.    Cardiovascular: Negative.  Negative for chest pain.   Gastrointestinal: Negative.  Negative for abdominal distention, abdominal pain, diarrhea, nausea and vomiting.   Endocrine: Negative.    Genitourinary: Negative.  Negative for difficulty urinating and flank pain.   Musculoskeletal: Negative.    Skin: Negative.  Negative for color change.   Neurological: Negative.  Negative for dizziness, loss of consciousness and headaches.   All other systems reviewed and are negative.      Past Medical History:   Diagnosis Date    CAD in native artery     Essential hypertension     GERD (gastroesophageal reflux disease)     Hyperlipidemia, unspecified     Kidney stone     Myocardial infarction      Palpitations        No Known Allergies    Past Surgical History:   Procedure Laterality Date    ANGIOPLASTY      with stents x 2    CARDIAC CATHETERIZATION      CARDIAC CATHETERIZATION Left 2022    Procedure: Cardiac Catheterization/Vascular Study;  Surgeon: Gumaro Dee MD;  Location:  [facility];  Service: Cardiology;  Laterality: Left;  1300 SLOT    CORONARY STENT PLACEMENT      OTHER SURGICAL HISTORY      Lithotripsy    TONSILLECTOMY         Family History   Problem Relation Age of Onset    Heart attack Father     Heart disease Father     Cardiomyopathy Father     Hypertension Sister     No Known Problems Mother        Social History     Socioeconomic History    Marital status:    Tobacco Use    Smoking status: Former     Current packs/day: 0.00     Types: Cigarettes     Start date:      Quit date:      Years since quittin.2    Smokeless tobacco: Former     Quit date:    Substance and Sexual Activity    Alcohol use: No    Drug use: No           Objective   Physical Exam  Vitals and nursing note reviewed. Exam conducted with a chaperone present.   Constitutional:       General: He is awake.      Appearance: Normal appearance. He is well-developed. He is not ill-appearing.   HENT:      Head: Normocephalic and atraumatic.   Eyes:      General: Lids are normal.      Pupils: Pupils are equal, round, and reactive to light.   Cardiovascular:      Rate and Rhythm: Normal rate.      Chest Wall: PMI is not displaced.      Pulses: Normal pulses.   Pulmonary:      Effort: Pulmonary effort is normal.      Breath sounds: No decreased breath sounds.   Musculoskeletal:         General: Normal range of motion.      Cervical back: Full passive range of motion without pain and normal range of motion.      Comments: The splint was removed ankle is tender to touch.  Neuro vas examination negative.  Cap refill is good.   Skin:     General: Skin is warm and dry.      Capillary  Refill: Capillary refill takes less than 2 seconds.   Neurological:      General: No focal deficit present.      Mental Status: He is alert and oriented to person, place, and time.      Motor: Motor function is intact.      Deep Tendon Reflexes: Reflexes are normal and symmetric.   Psychiatric:         Behavior: Behavior is cooperative.         Procedures           ED Course                                                       Medical Decision Making  Patient with a history of ankle fracture repeated x-ray that shows a distal fibular fracture and a small chip fracture of the tibia.  He is an appropriate treatment i.e. with this postop boot.  He does not need crutches.  He needs a follow-up orthopedics orthopedics normal to come and consult on him in the ED discussed this with the patient and he already has pain medication we will discharge him home.    Problems Addressed:  Closed fracture of left ankle, initial encounter: acute illness or injury    Amount and/or Complexity of Data Reviewed  Radiology: ordered.     Details: X-ray was reviewed    Risk  Risk Details: Need follow-up with orthopedics as an outpatient.        Final diagnoses:   Closed fracture of left ankle, initial encounter       ED Disposition  ED Disposition       ED Disposition   Discharge    Condition   Stable    Comment   --               Emily Lozano APRN  [address] [place] [address] [place] [id number] [phone]    Schedule an appointment as soon as possible for a visit       Huy Savage MD  [address] [place] [address] [address] [place] [id number] [phone]    Schedule an appointment as soon as possible for a visit            Medication List      No changes were made to your prescriptions during this visit.            Jg Delatorre MD  04/03/25 0044

## 2025-04-03 NOTE — TELEPHONE ENCOUNTER
Pt called back to get back on the schedule for the [facility], there was not opening until 4-7 and Pt declined appointment

## 2025-04-03 NOTE — TELEPHONE ENCOUNTER
patient states he is currently in the hospital and he is going through a few things right now and he states he will go through [facility] er and requested 4/3 appt cancellation. Appt cancelled per req

## 2025-05-22 ENCOUNTER — OFFICE VISIT (OUTPATIENT)
Dept: CARDIOLOGY | Facility: CLINIC | Age: 60
End: 2025-05-22
Payer: COMMERCIAL

## 2025-05-22 VITALS
HEIGHT: 72 IN | DIASTOLIC BLOOD PRESSURE: 86 MMHG | WEIGHT: 224 LBS | HEART RATE: 52 BPM | BODY MASS INDEX: 30.34 KG/M2 | OXYGEN SATURATION: 99 % | SYSTOLIC BLOOD PRESSURE: 136 MMHG

## 2025-05-22 DIAGNOSIS — I25.10 CAD IN NATIVE ARTERY: Primary | Chronic | ICD-10-CM

## 2025-05-22 DIAGNOSIS — E78.5 HYPERLIPIDEMIA, UNSPECIFIED HYPERLIPIDEMIA TYPE: ICD-10-CM

## 2025-05-22 DIAGNOSIS — I10 ESSENTIAL HYPERTENSION: ICD-10-CM

## 2025-05-22 DIAGNOSIS — Z95.828 S/P IVC FILTER: ICD-10-CM

## 2025-05-22 DIAGNOSIS — I49.3 PVC'S (PREMATURE VENTRICULAR CONTRACTIONS): ICD-10-CM

## 2025-05-22 RX ORDER — APIXABAN 5 MG/1
5 TABLET, FILM COATED ORAL EVERY 12 HOURS SCHEDULED
COMMUNITY

## 2025-05-22 RX ORDER — METOPROLOL SUCCINATE 25 MG/1
25 TABLET, EXTENDED RELEASE ORAL DAILY
COMMUNITY

## 2025-05-22 NOTE — PROGRESS NOTES
Subjective:     Encounter Date:05/22/2025      Patient ID: Jerry Bagley is a 59 y.o. male.    Chief Complaint: Transferring care for CAD, recent PE status post IVC filter placement  History of Present Illness  59-year-old presents today to transfer his ongoing care to me, after having been followed with Dr. Dee.  He was last seen by Dr. Dee in May 2023, with records indicating he has a remote PCI to the LAD but most recent catheterization from January 2022 showing nonobstructive disease and a patent stent.  Dr. Dee also mentioned patient having asymptomatic PVCs with a burden as high as 10% noted in January 2022; he states he was seen by EP at that time and not felt to need an ablation.    More recently, he was hospitalized at [facility] in [place] from 3/13/2025 until 3/18/2025.  Primary diagnosis was listed as a pulmonary embolism with records mentioning that he had a recent subarachnoid hemorrhage with small subdural hematoma.  Their notes suggest he had presented to [facility] for shortness of breath and chest pain radiating down the left arm.  They mention that no anticoagulation was given in [place] because of the recent subarachnoid hemorrhage.  The following day, he underwent placement of a IVC filter and on a heparin infusion.  Apparently he also underwent diagnostic coronary angiography during that stay, on March 17, it was reported to have nonocclusive disease.  He was started on Eliquis and discharged home on this, and reports suggest that cardiology wanted him to also take aspirin.    Further documentation revealed the patient had been chronically on Effient because of remote PCI, but that medication been stopped earlier in March with traumatic brain bleed.      The patient is a 59-year-old male who presents for a follow-up visit.    He was previously under the care of Dr. Dee, with his last consultation approximately 2 years ago. He reports  feeling better now than he has in the past 5 years. He has been monitoring his blood pressure at home, which has been elevated in recent weeks, with a reading of 160/104. He has not experienced any significant chest pain, tightness, pressure during activity, or unusual breathlessness during activity. He has made dietary modifications, including eliminating fried foods and incorporating air-fried foods into his diet.    He experienced a pulmonary embolism, which was initially misdiagnosed as a heart attack. A subsequent heart catheterization revealed no arterial blockages. This was his fourth heart catheterization, and an MRI confirmed that his stents were clear. He is scheduled to consult with Dr. Radford regarding his anticoagulant therapy. He was informed that his filter should be removed after 3 months and was advised to seek a local pulmonologist for this procedure.    He has experienced two episodes of syncope. The first episode occurred post-discharge from [place], resulting in a head injury and subsequent brain bleed. At that time, he was suffering from influenza and dehydration. His second syncope episode occurred upon standing up to let his dog out, during which he lost consciousness and fell, entangling his foot in a chair. He was leaning against the cabinet when this happened. He was taken to the ER where he was diagnosed with severe dehydration and acute kidney injury. He was admitted to [facility] for 2 days and received IV fluids. He believes he did not fully recover from his initial dehydration episode. Since his discharge on 04/01/2025, he has not experienced any further syncope or lightheadedness. He used to experience frequent dizzy spells while on medication, particularly when climbing stairs or bending over, but these have resolved since discontinuing the medication. He has been experiencing headaches since his last episode. His medications were discontinued following the brain bleed.    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      The following portions of the patient's history were reviewed and updated as appropriate: allergies, current medications, past family history, past medical history, past social history, past surgical history, and problem list.    Review of Systems   Constitutional: Negative for malaise/fatigue.   Cardiovascular:  Negative for chest pain, claudication, dyspnea on exertion, leg swelling, near-syncope, orthopnea, palpitations, paroxysmal nocturnal dyspnea and syncope.   Respiratory:  Negative for shortness of breath.    Hematologic/Lymphatic: Does not bruise/bleed easily.           Current Outpatient Medications:     atorvastatin (LIPITOR) 80 MG tablet, TAKE 1 TABLET BY MOUTH ONCE DAILY, Disp: 30 tablet, Rfl: 0    busPIRone (BUSPAR) 15 MG tablet, Take 1 tablet by mouth Daily., Disp: , Rfl:     Eliquis 5 MG tablet tablet, Take 1 tablet by mouth Every 12 (Twelve) Hours., Disp: , Rfl:     FLUoxetine (PROzac) 40 MG capsule, Take 1 capsule by mouth Daily., Disp: , Rfl:     metoprolol succinate XL (TOPROL-XL) 25 MG 24 hr tablet, Take 1 tablet by mouth Daily., Disp: , Rfl:     omeprazole (priLOSEC) 20 MG capsule, Take 1 capsule by mouth Daily., Disp: , Rfl:     nitroglycerin (NITROSTAT) 0.4 MG SL tablet, 1 under the tongue as needed for angina, may repeat q5mins for up three doses (Patient not taking: Reported on 5/22/2025), Disp: 100 tablet, Rfl: 11       Objective:      Vitals:    05/22/25 1303   BP: 136/86   Pulse: 52   SpO2: 99%     Vitals and nursing note reviewed.   Constitutional:       General: Not in acute distress.     Appearance: Not in distress.   Neck:      Vascular: No JVD or JVR. JVD normal.   Pulmonary:      Effort: Pulmonary effort  is normal.      Breath sounds: Normal breath sounds.   Cardiovascular:      Normal rate. Regular rhythm.      Murmurs: There is no murmur.      No gallop.  No rub.   Pulses:     Intact distal pulses.   Edema:     Peripheral edema absent.   Skin:     General: Skin is warm and dry.   Neurological:      Mental Status: Alert, oriented to person, place, and time and oriented to person, place and time.       Physical Exam      Lab Review:         ECG 12 Lead    Date/Time: 5/22/2025 4:42 PM  Performed by: Edy Vegas MD    Authorized by: Edy Vegas MD  Comparison: compared with previous ECG from 5/1/2023  Comparison to previous ECG: QTc is not prolonged, and T wave inversions are more notably inverted in lead aVF as well as V3 through V6  Rhythm: sinus bradycardia  BPM: 57  T inversion: III, aVF, II, V3, V4, V5 and V6  Other findings: T wave abnormality and prolonged QTc interval    Clinical impression: abnormal EKG        Results        Results for orders placed in visit on 05/01/23    Adult Transthoracic Echo Complete W/ Cont if Necessary Per Protocol    Interpretation Summary    Left ventricular systolic function is normal. Left ventricular ejection fraction appears to be 61 - 65%.    Left ventricular diastolic function is consistent with (grade I) impaired relaxation.    The left atrial cavity is mild to moderately dilated.    Left atrial volume is mildly increased.    The right atrial cavity is mildly  dilated.    Estimated right ventricular systolic pressure from tricuspid regurgitation is mildly elevated (35-45 mmHg).    Mild pulmonary hypertension is present.    Mild dilation of the aortic root is present. Mild dilation of the sinuses of Valsalva is present.      [facility] report reviewed:    Independent review of imaging, my interpretation:  - We received a disc of catheterization films from the procedure performed at [facility] from March 17, 2025.  Left ventriculogram reviewed which shows a  normal EF.  For unclear reasons, bilateral renal arteriography was performed and both appear patent and without significant disease.  Left main arises normally and bifurcates, having no significant disease.  Circumflex is a very large vessel arising from the distal left main that appears dominant for posterior circulation, supplying a small OM1, large caliber OM 2, small caliber posterolateral branch, then the small caliber PDA.  Minor luminal irregularities noted.  There is a stent from the mid circumflex extending into the medium to large caliber OM 2 that is widely patent.  LAD is medium caliber vessel with stent in the proximal vessel that is patent and otherwise have diffuse mild disease.  It supplies a diagonal branch.  The right coronary artery appears nondominant.    Echocardiogram performed [facility] on 3/14/2025, images reviewed with my interpretation:  -Normal size and function of the left ventricle.  Subcostal views demonstrating RV function show dyskinesis of the RV apex, but otherwise normal size and function the right ventricle.  No significant valvular disease noted.    Assessment/Plan:     Problem List Items Addressed This Visit          Cardiac and Vasculature    CAD in native artery - Primary (Chronic)    Overview   [facility] (anterior) - '09, s/p PCI    Another MI ~3 years later - two more stents    [facility], 1/5/2022: EF 60%.  Widely patent stent in proximal LAD with 40% plaque distally.  Left circumflex distal 40% plaque.  RCA free of disease         Relevant Medications    metoprolol succinate XL (TOPROL-XL) 25 MG 24 hr tablet    Essential hypertension (Chronic)    Relevant Medications    metoprolol succinate XL (TOPROL-XL) 25 MG 24 hr tablet    Hyperlipidemia, unspecified (Chronic)    PVC's (premature ventricular contractions) (Chronic)    Overview   48 -hour Holter monitor initiated 1/11/2022: PVC burden 10.91% 2 morphologies         Relevant Medications    metoprolol succinate XL (TOPROL-XL) 25  MG 24 hr tablet     Other Visit Diagnoses         S/P IVC filter        Relevant Orders    Ambulatory Referral to Vascular Surgery              Recommendations/plans:    Assessment & Plan  1. Coronary artery disease, native artery, without angina: Stable.  No symptoms.  We did review findings from recent cardiac catheterization performed in [place], and I did independently review the images as detailed below.  I advised that it was unclear to me why this was even needed, despite troponin elevation, in the setting of a PE, which is a known cause for elevated troponin, but nonetheless, it did show that his coronary disease has not progressed.  - No changes in treatment recommended.  - Continue high-intensity statin (atorvastatin) for plaque growth reduction and ischemic event protection.  - Resume aspirin 81 mg daily if Eliquis is discontinued.  - No additional antiplatelet therapy needed while on Eliquis due to the significant time elapsed since stent placement.    2. Pulmonary embolism:  - Referral to vascular surgery initiated for IVC filter removal when appropriate.  - Scheduled to consult with Dr. Radford in [place] to determine if PE was provoked or unprovoked, influencing anticoagulation therapy continuation or cessation.  - If anticoagulation is stopped after a finite period, resume aspirin 81 mg daily due to prior PCI.    3.  Essential hypertension: Well-controlled.  Continue current medications including Toprol-XL.  - Advised to bring his home cuff to his next doctor's appointment to check its accuracy against a manual cuff in office    4.  Hyperlipidemia: On appropriate high intensity statin given known CAD.  Goal LDL less than 70, preferably less than 55.  Continue current therapy with annual lipid surveillance per PCP.    5.  Frequent PVCs: Previously known by Dr. Dee.  Patient has no symptoms attributable to this.  Continue metoprolol for suppression.    Follow-up:  - Follow up in 1 year for routine  follow-up or sooner with any new or worsening symptoms in the meantime.    50 minutes spent on day of service, excluding time spent interpreting ECG    Edy Vegas MD  05/22/2025  16:44 CDT    Transcribed from ambient dictation for Edy Vegas MD by Edy Vegas MD.  05/22/25   13:22 CDT    Patient or patient representative verbalized consent to the visit recording.

## 2025-05-27 ENCOUNTER — TELEPHONE (OUTPATIENT)
Dept: VASCULAR SURGERY | Facility: CLINIC | Age: 60
End: 2025-05-27
Payer: COMMERCIAL

## 2025-05-28 ENCOUNTER — OFFICE VISIT (OUTPATIENT)
Dept: VASCULAR SURGERY | Facility: CLINIC | Age: 60
End: 2025-05-28
Payer: COMMERCIAL

## 2025-05-28 VITALS
HEIGHT: 72 IN | WEIGHT: 221 LBS | DIASTOLIC BLOOD PRESSURE: 80 MMHG | BODY MASS INDEX: 29.93 KG/M2 | HEART RATE: 64 BPM | SYSTOLIC BLOOD PRESSURE: 132 MMHG | OXYGEN SATURATION: 98 %

## 2025-05-28 DIAGNOSIS — Z01.818 PREOP TESTING: ICD-10-CM

## 2025-05-28 DIAGNOSIS — Z79.02 ENCOUNTER FOR MONITORING ANTIPLATELET THERAPY: ICD-10-CM

## 2025-05-28 DIAGNOSIS — I82.90 VTE (VENOUS THROMBOEMBOLISM): Primary | ICD-10-CM

## 2025-05-28 DIAGNOSIS — Z51.81 ENCOUNTER FOR MONITORING ANTIPLATELET THERAPY: ICD-10-CM

## 2025-05-28 PROBLEM — I82.409 DVT (DEEP VENOUS THROMBOSIS): Status: ACTIVE | Noted: 2025-05-28

## 2025-05-28 NOTE — PROGRESS NOTES
05/28/2025      Edy Vegas MD  [address]  [place] [address]    Jerry Bagley  1965    Chief Complaint   Patient presents with    S/P IVC filter     THE 13TH OF NEXT MONTH IT WILL BE IN FOR 3 MONTHS. PATIENT SAYS IT WAS DONE IN [place] AND WAS TOLD BY THEM TO FIND SOMEONE LOCAL TO REMOVE.       Dear Edy Vegas MD:      HPI  I had the pleasure of seeing your patient Jerry Bagley in the office today.  Thank you kindly for this consultation.  As you recall, Jerry Bagley is a 59 y.o.  male who you are currently following for cardiology care.  Patient was referred to me for IVC filter retrieval.  Patient has a longstanding history of being on antiplatelets after a PCI.  Back in March the patient was diagnosed with the flu.  He states he became dehydrated and fell and hit his head and developed subarachnoid hemorrhage.  Approximately a week later he presented to an outside facility with a PE.  He was then transferred to [facility] where he underwent cardiac cath due to elevated troponins.  He was ultimately diagnosed with a PE.  Per report the patient was started on a heparin drip and IVC filter was inserted around the same time for the subarachnoid hemorrhage.  Since then the patient has been on Eliquis and has had no issues with bleeding.  He is unaware of any ultrasound to confirm that the patient had a DVT.  He was told that the outside facility that the IVC filter needed to be retrieved in approximately 3 months.    Past Medical History:   Diagnosis Date    CAD in native artery     Essential hypertension     GERD (gastroesophageal reflux disease)     Hyperlipidemia, unspecified     Kidney stone     Myocardial infarction     Palpitations     Pulmonary embolism        Past Surgical History:   Procedure Laterality Date    ANGIOPLASTY      with stents x 2    CARDIAC CATHETERIZATION      CARDIAC CATHETERIZATION Left 1/5/2022    Procedure: Cardiac  Catheterization/Vascular Study;  Surgeon: Gumaro Dee MD;  Location:  [facility] CATH INVASIVE LOCATION;  Service: Cardiology;  Laterality: Left;  1300 SLOT    CORONARY STENT PLACEMENT      OTHER SURGICAL HISTORY      Lithotripsy    TONSILLECTOMY         Family History   Problem Relation Age of Onset    Heart attack Father     Heart disease Father     Cardiomyopathy Father     Hypertension Sister     No Known Problems Mother        Social History     Socioeconomic History    Marital status:    Tobacco Use    Smoking status: Former     Current packs/day: 0.00     Types: Cigarettes     Start date:      Quit date:      Years since quittin.4    Smokeless tobacco: Former     Quit date:    Vaping Use    Vaping status: Never Used   Substance and Sexual Activity    Alcohol use: No    Drug use: No    Sexual activity: Defer       No Known Allergies    Prior to Admission medications    Medication Sig Start Date End Date Taking? Authorizing Provider   atorvastatin (LIPITOR) 80 MG tablet TAKE 1 TABLET BY MOUTH ONCE DAILY 22  Yes Jostin Skaggs APRN   busPIRone (BUSPAR) 15 MG tablet Take 1 tablet by mouth Daily.   Yes ProviderHowie MD   Eliquis 5 MG tablet tablet Take 1 tablet by mouth Every 12 (Twelve) Hours.   Yes Howie Mahmood MD   FLUoxetine (PROzac) 40 MG capsule Take 1 capsule by mouth Daily.   Yes Howie Mahmood MD   metoprolol succinate XL (TOPROL-XL) 25 MG 24 hr tablet Take 1 tablet by mouth Daily.   Yes Howie Mahmood MD   omeprazole (priLOSEC) 20 MG capsule Take 1 capsule by mouth Daily.   Yes ProviderHowie MD   nitroglycerin (NITROSTAT) 0.4 MG SL tablet 1 under the tongue as needed for angina, may repeat q5mins for up three doses  Patient not taking: Reported on 2025   Gumaro Dee MD       Review of Systems   Constitutional: Negative.  Negative for diaphoresis and fever.   HENT: Negative.     Eyes: Negative.   "  Respiratory: Negative.  Negative for shortness of breath and wheezing.    Cardiovascular: Negative.  Negative for chest pain and leg swelling.   Gastrointestinal: Negative.  Negative for abdominal pain.   Endocrine: Negative.    Genitourinary: Negative.    Musculoskeletal: Negative.    Skin: Negative.    Allergic/Immunologic: Negative.    Neurological: Negative.  Negative for dizziness and weakness.   Hematological: Negative.    Psychiatric/Behavioral: Negative.         /80   Pulse 64   Ht 182.9 cm (72\")   Wt 100 kg (221 lb)   SpO2 98%   BMI 29.97 kg/m²   Physical Exam  Vitals and nursing note reviewed.   Constitutional:       General: He is not in acute distress.     Appearance: Normal appearance. He is not diaphoretic.   HENT:      Head: Normocephalic. No right periorbital erythema or left periorbital erythema.      Nose: Nose normal.   Eyes:      General: No scleral icterus.     Pupils: Pupils are equal.   Cardiovascular:      Rate and Rhythm: Normal rate and regular rhythm.   Pulmonary:      Effort: Pulmonary effort is normal. No respiratory distress.      Breath sounds: Normal breath sounds.   Abdominal:      General: There is no distension.      Palpations: Abdomen is soft.      Tenderness: There is no abdominal tenderness. There is no guarding.   Musculoskeletal:         General: No swelling or tenderness. Normal range of motion.      Cervical back: Normal range of motion and neck supple.      Right lower leg: No edema.      Left lower leg: No edema.   Feet:      Right foot:      Skin integrity: Skin integrity normal.      Left foot:      Skin integrity: Skin integrity normal.   Skin:     General: Skin is warm and dry.      Findings: No erythema or rash.   Neurological:      General: No focal deficit present.      Mental Status: He is alert and oriented to person, place, and time. Mental status is at baseline.      Cranial Nerves: No cranial nerve deficit.      Gait: Gait normal.   Psychiatric:   "       Attention and Perception: Attention normal.         Mood and Affect: Mood normal.         Behavior: Behavior normal.         Thought Content: Thought content normal.         Judgment: Judgment normal.         No results found.    Diagnoses and all orders for this visit:    1. VTE (venous thromboembolism) (Primary)  -     CBC and Differential; Future  -     Basic metabolic panel; Future  -     APTT; Future  -     Protime-INR; Future  -     XR chest 2 vw; Future  -     Case Request; Standing  -     ceFAZolin (ANCEF) 2,000 mg in sodium chloride 0.9 % 100 mL IVPB  -     Case Request    2. Encounter for monitoring antiplatelet therapy  -     APTT; Future  -     Protime-INR; Future    3. Preop testing  -     CBC and Differential; Future  -     Basic metabolic panel; Future  -     XR chest 2 vw; Future  -     Case Request; Standing  -     ceFAZolin (ANCEF) 2,000 mg in sodium chloride 0.9 % 100 mL IVPB  -     Case Request    Other orders  -     Follow Anesthesia Guidelines / Protocol; Future  -     Follow Anesthesia Guidelines / Protocol; Standing  -     Provide NPO Instructions to Patient; Future  -     Chlorhexidine Skin Prep; Future  -     Instructions on coughing, deep breathing, and incentive spirometry.; Standing  -     Outpatient In A Bed; Standing  -     Chlorhexidine Skin Prep; Standing  -     Type & Screen; Standing         Lab Frequency Next Occurrence   Follow Anesthesia Guidelines / Protocol Once 06/02/2025   Provide NPO Instructions to Patient Once 06/02/2025   Chlorhexidine Skin Prep Once 06/02/2025   CBC and Differential Once 06/02/2025   Basic metabolic panel Once 06/02/2025   APTT Once 06/02/2025   Protime-INR Once 06/02/2025   XR chest 2 vw Once 06/02/2025       Plan: After thoroughly evaluating Jerry Bagley, I believe the best course of action is to proceed with IVC filter retrieval.  Risk and benefits of procedure were discussed with the patient including bleeding, infection, pneumothorax,  damage surrounding structures, injury to the IVC, PE, and death.  Patient understands what to proceed with procedure.  Patient will need to hold his Eliquis for 2 days prior to procedure.            Body mass index is 29.97 kg/m².  BMI is >= 25 and <30. (Overweight) The following options were offered after discussion;: referral to primary care    The patient can continue taking their current medication regimen as previously planned.  This was all discussed in full with complete understanding.    Thank you for allowing me to participate in the care of your patient.  Please do not hesitate with any questions or concerns.  I will keep you aware of any further encounters with Jerry Bagley.        Sincerely yours,         Rafa Martinez MD

## 2025-05-29 ENCOUNTER — TELEPHONE (OUTPATIENT)
Dept: VASCULAR SURGERY | Facility: CLINIC | Age: 60
End: 2025-05-29
Payer: COMMERCIAL

## 2025-05-29 NOTE — TELEPHONE ENCOUNTER
Your surgery is scheduled for 06/09/2025, you need to be at the hospital at 5:00 AM.  Nothing to eat or drink after midnight the night before your procedure. Patient will continue to take current medication regimen uninterrupted prior to surgery. Patient will need to hold his Eliquis for 2 days prior to procedure. Your pre work is scheduled for 06/06/2025, you need to be at the hospital at 1:30 PM. You do not need to fast before your pre work. If you have any questions or concerns, please contact our office at [phone].

## 2025-06-05 ENCOUNTER — TELEPHONE (OUTPATIENT)
Dept: VASCULAR SURGERY | Facility: CLINIC | Age: 60
End: 2025-06-05
Payer: COMMERCIAL

## 2025-06-06 ENCOUNTER — PRE-ADMISSION TESTING (OUTPATIENT)
Dept: PREADMISSION TESTING | Facility: HOSPITAL | Age: 60
End: 2025-06-06
Payer: COMMERCIAL

## 2025-06-06 ENCOUNTER — HOSPITAL ENCOUNTER (OUTPATIENT)
Dept: GENERAL RADIOLOGY | Facility: HOSPITAL | Age: 60
Discharge: HOME | End: 2025-06-06
Payer: COMMERCIAL

## 2025-06-06 VITALS
SYSTOLIC BLOOD PRESSURE: 140 MMHG | HEART RATE: 67 BPM | WEIGHT: 222.88 LBS | OXYGEN SATURATION: 98 % | BODY MASS INDEX: 30.19 KG/M2 | DIASTOLIC BLOOD PRESSURE: 89 MMHG | HEIGHT: 72 IN | RESPIRATION RATE: 16 BRPM

## 2025-06-06 DIAGNOSIS — I82.90 VTE (VENOUS THROMBOEMBOLISM): ICD-10-CM

## 2025-06-06 DIAGNOSIS — Z51.81 ENCOUNTER FOR MONITORING ANTIPLATELET THERAPY: ICD-10-CM

## 2025-06-06 DIAGNOSIS — Z79.02 ENCOUNTER FOR MONITORING ANTIPLATELET THERAPY: ICD-10-CM

## 2025-06-06 DIAGNOSIS — Z01.818 PREOP TESTING: ICD-10-CM

## 2025-06-06 LAB
ANION GAP SERPL CALCULATED.3IONS-SCNC: 12 MMOL/L (ref 5–15)
APTT PPP: 24.4 SECONDS (ref 24.5–36)
BASOPHILS # BLD AUTO: 0.03 10*3/MM3 (ref 0–0.2)
BASOPHILS NFR BLD AUTO: 0.5 % (ref 0–1.5)
BUN SERPL-MCNC: 13 MG/DL (ref 8–23)
BUN/CREAT SERPL: 12.6 (ref 7–25)
CALCIUM SPEC-SCNC: 9.4 MG/DL (ref 8.6–10.5)
CHLORIDE SERPL-SCNC: 103 MMOL/L (ref 98–107)
CO2 SERPL-SCNC: 25 MMOL/L (ref 22–29)
CREAT SERPL-MCNC: 1.03 MG/DL (ref 0.76–1.27)
DEPRECATED RDW RBC AUTO: 45.1 FL (ref 37–54)
EGFRCR SERPLBLD CKD-EPI 2021: 82.6 ML/MIN/1.73
EOSINOPHIL # BLD AUTO: 0.27 10*3/MM3 (ref 0–0.4)
EOSINOPHIL NFR BLD AUTO: 4.2 % (ref 0.3–6.2)
ERYTHROCYTE [DISTWIDTH] IN BLOOD BY AUTOMATED COUNT: 13.9 % (ref 12.3–15.4)
GLUCOSE SERPL-MCNC: 120 MG/DL (ref 65–99)
HCT VFR BLD AUTO: 36.1 % (ref 37.5–51)
HGB BLD-MCNC: 11.6 G/DL (ref 13–17.7)
IMM GRANULOCYTES # BLD AUTO: 0.01 10*3/MM3 (ref 0–0.05)
IMM GRANULOCYTES NFR BLD AUTO: 0.2 % (ref 0–0.5)
INR PPP: 1.02 (ref 0.91–1.09)
LYMPHOCYTES # BLD AUTO: 1.81 10*3/MM3 (ref 0.7–3.1)
LYMPHOCYTES NFR BLD AUTO: 28.1 % (ref 19.6–45.3)
MCH RBC QN AUTO: 28.4 PG (ref 26.6–33)
MCHC RBC AUTO-ENTMCNC: 32.1 G/DL (ref 31.5–35.7)
MCV RBC AUTO: 88.3 FL (ref 79–97)
MONOCYTES # BLD AUTO: 0.4 10*3/MM3 (ref 0.1–0.9)
MONOCYTES NFR BLD AUTO: 6.2 % (ref 5–12)
NEUTROPHILS NFR BLD AUTO: 3.92 10*3/MM3 (ref 1.7–7)
NEUTROPHILS NFR BLD AUTO: 60.8 % (ref 42.7–76)
NRBC BLD AUTO-RTO: 0 /100 WBC (ref 0–0.2)
PLATELET # BLD AUTO: 198 10*3/MM3 (ref 140–450)
PMV BLD AUTO: 10.6 FL (ref 6–12)
POTASSIUM SERPL-SCNC: 3.9 MMOL/L (ref 3.5–5.2)
PROTHROMBIN TIME: 13.9 SECONDS (ref 11.8–14.8)
RBC # BLD AUTO: 4.09 10*6/MM3 (ref 4.14–5.8)
SODIUM SERPL-SCNC: 140 MMOL/L (ref 136–145)
WBC NRBC COR # BLD AUTO: 6.44 10*3/MM3 (ref 3.4–10.8)

## 2025-06-06 PROCEDURE — 80048 BASIC METABOLIC PNL TOTAL CA: CPT

## 2025-06-06 PROCEDURE — 71046 X-RAY EXAM CHEST 2 VIEWS: CPT

## 2025-06-06 PROCEDURE — 85025 COMPLETE CBC W/AUTO DIFF WBC: CPT

## 2025-06-06 PROCEDURE — 85610 PROTHROMBIN TIME: CPT

## 2025-06-06 PROCEDURE — 85730 THROMBOPLASTIN TIME PARTIAL: CPT

## 2025-06-06 PROCEDURE — 36415 COLL VENOUS BLD VENIPUNCTURE: CPT

## 2025-06-06 NOTE — DISCHARGE INSTRUCTIONS

## 2025-06-09 ENCOUNTER — TELEPHONE (OUTPATIENT)
Dept: VASCULAR SURGERY | Facility: CLINIC | Age: 60
End: 2025-06-09
Payer: COMMERCIAL

## 2025-06-09 NOTE — H&P
6/9/2025          Edy Vegas MD  [address] [facility]  [facility] [address]  [place] [address]     Jerry Bagley  1965          Chief Complaint   Patient presents with    S/P IVC filter       THE 13TH OF NEXT MONTH IT WILL BE IN FOR 3 MONTHS. PATIENT SAYS IT WAS DONE IN [place] AND WAS TOLD BY THEM TO FIND SOMEONE LOCAL TO REMOVE.         Dear Edy Vegas MD:        HPI  I had the pleasure of seeing your patient eJrry Bagley in the office today.  Thank you kindly for this consultation.  As you recall, Jerry Bagley is a 59 y.o.  male who you are currently following for cardiology care.  Patient was referred to me for IVC filter retrieval.  Patient has a longstanding history of being on antiplatelets after a PCI.  Back in March the patient was diagnosed with the flu.  He states he became dehydrated and fell and hit his head and developed subarachnoid hemorrhage.  Approximately a week later he presented to an outside facility with a PE.  He was then transferred to [facility] where he underwent cardiac cath due to elevated troponins.  He was ultimately diagnosed with a PE.  Per report the patient was started on a heparin drip and IVC filter was inserted around the same time for the subarachnoid hemorrhage.  Since then the patient has been on Eliquis and has had no issues with bleeding.  He is unaware of any ultrasound to confirm that the patient had a DVT.  He was told that the outside facility that the IVC filter needed to be retrieved in approximately 3 months.     Past Medical History:   Diagnosis Date    Brain bleed     CAD in native artery     CHF (congestive heart failure)     Essential hypertension     GERD (gastroesophageal reflux disease)     Hyperlipidemia, unspecified     Kidney stone     Myocardial infarction     Palpitations     Pulmonary embolism     PVC (premature ventricular contraction)       Past Surgical History:   Procedure Laterality Date    ANGIOPLASTY      with  stents x 2    CARDIAC CATHETERIZATION      CARDIAC CATHETERIZATION Left 2022    Procedure: Cardiac Catheterization/Vascular Study;  Surgeon: Gumaro Dee MD;  Location:  [facility];  Service: Cardiology;  Laterality: Left;  1300 SLOT    CORONARY STENT PLACEMENT      CYSTOSCOPY URETEROSCOPY LASER LITHOTRIPSY      TONSILLECTOMY       Social History     Socioeconomic History    Marital status:    Tobacco Use    Smoking status: Former     Current packs/day: 0.00     Types: Cigarettes     Start date:      Quit date:      Years since quittin.4    Smokeless tobacco: Former     Quit date:    Vaping Use    Vaping status: Never Used   Substance and Sexual Activity    Alcohol use: No    Drug use: No    Sexual activity: Defer     Family History   Problem Relation Age of Onset    Heart attack Father     Heart disease Father     Cardiomyopathy Father     Hypertension Sister     No Known Problems Mother      No Known Allergies  Current Outpatient Medications   Medication Instructions    atorvastatin (LIPITOR) 80 MG tablet TAKE 1 TABLET BY MOUTH ONCE DAILY    busPIRone (BUSPAR) 15 mg, Daily    Eliquis 5 mg, Daily    FLUoxetine (PROZAC) 40 mg, Daily    metoprolol succinate XL (TOPROL-XL) 25 mg, Nightly    nitroglycerin (NITROSTAT) 0.4 MG SL tablet 1 under the tongue as needed for angina, may repeat q5mins for up three doses    omeprazole (PRILOSEC) 20 mg, Daily          Review of Systems   Constitutional: Negative.  Negative for diaphoresis and fever.   HENT: Negative.     Eyes: Negative.    Respiratory: Negative.  Negative for shortness of breath and wheezing.    Cardiovascular: Negative.  Negative for chest pain and leg swelling.   Gastrointestinal: Negative.  Negative for abdominal pain.   Endocrine: Negative.    Genitourinary: Negative.    Musculoskeletal: Negative.    Skin: Negative.    Allergic/Immunologic: Negative.    Neurological: Negative.  Negative for dizziness  "and weakness.   Hematological: Negative.    Psychiatric/Behavioral: Negative.           /80   Pulse 64   Ht 182.9 cm (72\")   Wt 100 kg (221 lb)   SpO2 98%   BMI 29.97 kg/m²   Physical Exam  Vitals and nursing note reviewed.   Constitutional:       General: He is not in acute distress.     Appearance: Normal appearance. He is not diaphoretic.   HENT:      Head: Normocephalic. No right periorbital erythema or left periorbital erythema.      Nose: Nose normal.   Eyes:      General: No scleral icterus.     Pupils: Pupils are equal.   Cardiovascular:      Rate and Rhythm: Normal rate and regular rhythm.   Pulmonary:      Effort: Pulmonary effort is normal. No respiratory distress.      Breath sounds: Normal breath sounds.   Abdominal:      General: There is no distension.      Palpations: Abdomen is soft.      Tenderness: There is no abdominal tenderness. There is no guarding.   Musculoskeletal:         General: No swelling or tenderness. Normal range of motion.      Cervical back: Normal range of motion and neck supple.      Right lower leg: No edema.      Left lower leg: No edema.   Feet:      Right foot:      Skin integrity: Skin integrity normal.      Left foot:      Skin integrity: Skin integrity normal.   Skin:     General: Skin is warm and dry.      Findings: No erythema or rash.   Neurological:      General: No focal deficit present.      Mental Status: He is alert and oriented to person, place, and time. Mental status is at baseline.      Cranial Nerves: No cranial nerve deficit.      Gait: Gait normal.   Psychiatric:         Attention and Perception: Attention normal.         Mood and Affect: Mood normal.         Behavior: Behavior normal.         Thought Content: Thought content normal.         Judgment: Judgment normal.            No results found.     Diagnoses and all orders for this visit:     1. VTE (venous thromboembolism) (Primary)  -     CBC and Differential; Future  -     Basic metabolic " panel; Future  -     APTT; Future  -     Protime-INR; Future  -     XR chest 2 vw; Future  -     Case Request; Standing  -     ceFAZolin (ANCEF) 2,000 mg in sodium chloride 0.9 % 100 mL IVPB  -     Case Request     2. Encounter for monitoring antiplatelet therapy  -     APTT; Future  -     Protime-INR; Future     3. Preop testing  -     CBC and Differential; Future  -     Basic metabolic panel; Future  -     XR chest 2 vw; Future  -     Case Request; Standing  -     ceFAZolin (ANCEF) 2,000 mg in sodium chloride 0.9 % 100 mL IVPB  -     Case Request     Other orders  -     Follow Anesthesia Guidelines / Protocol; Future  -     Follow Anesthesia Guidelines / Protocol; Standing  -     Provide NPO Instructions to Patient; Future  -     Chlorhexidine Skin Prep; Future  -     Instructions on coughing, deep breathing, and incentive spirometry.; Standing  -     Outpatient In A Bed; Standing  -     Chlorhexidine Skin Prep; Standing  -     Type & Screen; Standing           Lab Frequency Next Occurrence   Follow Anesthesia Guidelines / Protocol Once 06/02/2025   Provide NPO Instructions to Patient Once 06/02/2025   Chlorhexidine Skin Prep Once 06/02/2025   CBC and Differential Once 06/02/2025   Basic metabolic panel Once 06/02/2025   APTT Once 06/02/2025   Protime-INR Once 06/02/2025   XR chest 2 vw Once 06/02/2025         Plan: After thoroughly evaluating Jerry Bagley, I believe the best course of action is to proceed with IVC filter retrieval.  Risk and benefits of procedure were discussed with the patient including bleeding, infection, pneumothorax, damage surrounding structures, injury to the IVC, PE, and death.  Patient understands what to proceed with procedure.  Patient will need to hold his Eliquis for 2 days prior to procedure.                 Body mass index is 29.97 kg/m².  BMI is >= 25 and <30. (Overweight) The following options were offered after discussion;: referral to primary care    The patient can  continue taking their current medication regimen as previously planned.  This was all discussed in full with complete understanding.     Thank you for allowing me to participate in the care of your patient.  Please do not hesitate with any questions or concerns.  I will keep you aware of any further encounters with Jerry Bagley.           Sincerely yours,           Rafa Martinez MD

## 2025-06-09 NOTE — TELEPHONE ENCOUNTER
ATTEMPTED TO REACH PT AS A REMINDER OF 0700 ARRIVAL MAIN REG FOR SURGERY. NOTHING TO EAT OR DRINK AFTER MIDNIGHT. HOLD ELIQUIS 2 DAYS. NO VM TO LEAVE MESSAGE.

## 2025-06-10 ENCOUNTER — APPOINTMENT (OUTPATIENT)
Dept: INTERVENTIONAL RADIOLOGY/VASCULAR | Facility: HOSPITAL | Age: 60
End: 2025-06-10
Payer: COMMERCIAL

## 2025-06-10 ENCOUNTER — ANESTHESIA (OUTPATIENT)
Dept: PERIOP | Facility: HOSPITAL | Age: 60
End: 2025-06-10
Payer: COMMERCIAL

## 2025-06-10 ENCOUNTER — ANESTHESIA EVENT (OUTPATIENT)
Dept: PERIOP | Facility: HOSPITAL | Age: 60
End: 2025-06-10
Payer: COMMERCIAL

## 2025-06-10 ENCOUNTER — HOSPITAL ENCOUNTER (OUTPATIENT)
Facility: HOSPITAL | Age: 60
Discharge: HOME | End: 2025-06-10
Attending: STUDENT IN AN ORGANIZED HEALTH CARE EDUCATION/TRAINING PROGRAM | Admitting: STUDENT IN AN ORGANIZED HEALTH CARE EDUCATION/TRAINING PROGRAM
Payer: COMMERCIAL

## 2025-06-10 VITALS
HEART RATE: 41 BPM | SYSTOLIC BLOOD PRESSURE: 169 MMHG | DIASTOLIC BLOOD PRESSURE: 109 MMHG | TEMPERATURE: 98 F | RESPIRATION RATE: 16 BRPM | OXYGEN SATURATION: 98 %

## 2025-06-10 DIAGNOSIS — I82.90 VTE (VENOUS THROMBOEMBOLISM): ICD-10-CM

## 2025-06-10 DIAGNOSIS — Z01.818 PREOP TESTING: ICD-10-CM

## 2025-06-10 LAB
ABO GROUP BLD: NORMAL
BLD GP AB SCN SERPL QL: NEGATIVE
RH BLD: POSITIVE
T&S EXPIRATION DATE: NORMAL

## 2025-06-10 PROCEDURE — 25510000001 IOPAMIDOL 61 % SOLUTION: Performed by: STUDENT IN AN ORGANIZED HEALTH CARE EDUCATION/TRAINING PROGRAM

## 2025-06-10 PROCEDURE — 25010000002 PROPOFOL 1000 MG/100ML EMULSION

## 2025-06-10 PROCEDURE — 86901 BLOOD TYPING SEROLOGIC RH(D): CPT | Performed by: STUDENT IN AN ORGANIZED HEALTH CARE EDUCATION/TRAINING PROGRAM

## 2025-06-10 PROCEDURE — 25010000002 HEPARIN (PORCINE) PER 1000 UNITS: Performed by: STUDENT IN AN ORGANIZED HEALTH CARE EDUCATION/TRAINING PROGRAM

## 2025-06-10 PROCEDURE — 25010000002 PROPOFOL 10 MG/ML EMULSION

## 2025-06-10 PROCEDURE — 37193 REM ENDOVAS VENA CAVA FILTER: CPT | Performed by: STUDENT IN AN ORGANIZED HEALTH CARE EDUCATION/TRAINING PROGRAM

## 2025-06-10 PROCEDURE — 25010000002 HEPARIN (PORCINE) PER 1000 UNITS

## 2025-06-10 PROCEDURE — C1769 GUIDE WIRE: HCPCS | Performed by: STUDENT IN AN ORGANIZED HEALTH CARE EDUCATION/TRAINING PROGRAM

## 2025-06-10 PROCEDURE — 25010000002 BUPIVACAINE (PF) 0.5 % SOLUTION: Performed by: STUDENT IN AN ORGANIZED HEALTH CARE EDUCATION/TRAINING PROGRAM

## 2025-06-10 PROCEDURE — C1894 INTRO/SHEATH, NON-LASER: HCPCS | Performed by: STUDENT IN AN ORGANIZED HEALTH CARE EDUCATION/TRAINING PROGRAM

## 2025-06-10 PROCEDURE — 25810000003 LACTATED RINGERS PER 1000 ML: Performed by: STUDENT IN AN ORGANIZED HEALTH CARE EDUCATION/TRAINING PROGRAM

## 2025-06-10 PROCEDURE — 25010000002 LIDOCAINE PF 2% 2 % SOLUTION

## 2025-06-10 PROCEDURE — C1773 RET DEV, INSERTABLE: HCPCS | Performed by: STUDENT IN AN ORGANIZED HEALTH CARE EDUCATION/TRAINING PROGRAM

## 2025-06-10 PROCEDURE — 76000 FLUOROSCOPY <1 HR PHYS/QHP: CPT

## 2025-06-10 PROCEDURE — 86850 RBC ANTIBODY SCREEN: CPT | Performed by: STUDENT IN AN ORGANIZED HEALTH CARE EDUCATION/TRAINING PROGRAM

## 2025-06-10 PROCEDURE — 86900 BLOOD TYPING SEROLOGIC ABO: CPT | Performed by: STUDENT IN AN ORGANIZED HEALTH CARE EDUCATION/TRAINING PROGRAM

## 2025-06-10 PROCEDURE — 25010000002 CEFAZOLIN PER 500 MG: Performed by: STUDENT IN AN ORGANIZED HEALTH CARE EDUCATION/TRAINING PROGRAM

## 2025-06-10 RX ORDER — NALOXONE HCL 0.4 MG/ML
0.4 VIAL (ML) INJECTION AS NEEDED
Status: DISCONTINUED | OUTPATIENT
Start: 2025-06-10 | End: 2025-06-10 | Stop reason: HOSPADM

## 2025-06-10 RX ORDER — ATORVASTATIN CALCIUM 80 MG/1
80 TABLET, FILM COATED ORAL DAILY
COMMUNITY

## 2025-06-10 RX ORDER — HEPARIN SODIUM 1000 [USP'U]/ML
INJECTION, SOLUTION INTRAVENOUS; SUBCUTANEOUS AS NEEDED
Status: DISCONTINUED | OUTPATIENT
Start: 2025-06-10 | End: 2025-06-10 | Stop reason: SURG

## 2025-06-10 RX ORDER — SODIUM CHLORIDE, SODIUM LACTATE, POTASSIUM CHLORIDE, CALCIUM CHLORIDE 600; 310; 30; 20 MG/100ML; MG/100ML; MG/100ML; MG/100ML
1000 INJECTION, SOLUTION INTRAVENOUS CONTINUOUS
Status: DISCONTINUED | OUTPATIENT
Start: 2025-06-10 | End: 2025-06-10 | Stop reason: HOSPADM

## 2025-06-10 RX ORDER — HYDROCODONE BITARTRATE AND ACETAMINOPHEN 5; 325 MG/1; MG/1
1 TABLET ORAL EVERY 4 HOURS PRN
Status: DISCONTINUED | OUTPATIENT
Start: 2025-06-10 | End: 2025-06-10 | Stop reason: HOSPADM

## 2025-06-10 RX ORDER — FLUMAZENIL 0.1 MG/ML
0.2 INJECTION INTRAVENOUS AS NEEDED
Status: DISCONTINUED | OUTPATIENT
Start: 2025-06-10 | End: 2025-06-10 | Stop reason: HOSPADM

## 2025-06-10 RX ORDER — IOPAMIDOL 612 MG/ML
INJECTION, SOLUTION INTRAVASCULAR AS NEEDED
Status: DISCONTINUED | OUTPATIENT
Start: 2025-06-10 | End: 2025-06-10 | Stop reason: HOSPADM

## 2025-06-10 RX ORDER — BUPIVACAINE HYDROCHLORIDE 5 MG/ML
INJECTION, SOLUTION EPIDURAL; INTRACAUDAL; PERINEURAL AS NEEDED
Status: DISCONTINUED | OUTPATIENT
Start: 2025-06-10 | End: 2025-06-10 | Stop reason: HOSPADM

## 2025-06-10 RX ORDER — LABETALOL HYDROCHLORIDE 5 MG/ML
5 INJECTION, SOLUTION INTRAVENOUS
Status: DISCONTINUED | OUTPATIENT
Start: 2025-06-10 | End: 2025-06-10 | Stop reason: HOSPADM

## 2025-06-10 RX ORDER — PROPOFOL 10 MG/ML
VIAL (ML) INTRAVENOUS AS NEEDED
Status: DISCONTINUED | OUTPATIENT
Start: 2025-06-10 | End: 2025-06-10 | Stop reason: SURG

## 2025-06-10 RX ORDER — LIDOCAINE HYDROCHLORIDE 20 MG/ML
INJECTION, SOLUTION EPIDURAL; INFILTRATION; INTRACAUDAL; PERINEURAL AS NEEDED
Status: DISCONTINUED | OUTPATIENT
Start: 2025-06-10 | End: 2025-06-10 | Stop reason: SURG

## 2025-06-10 RX ORDER — LIDOCAINE HYDROCHLORIDE 10 MG/ML
0.5 INJECTION, SOLUTION EPIDURAL; INFILTRATION; INTRACAUDAL; PERINEURAL ONCE AS NEEDED
Status: DISCONTINUED | OUTPATIENT
Start: 2025-06-10 | End: 2025-06-10 | Stop reason: HOSPADM

## 2025-06-10 RX ORDER — ONDANSETRON 2 MG/ML
4 INJECTION INTRAMUSCULAR; INTRAVENOUS ONCE AS NEEDED
Status: DISCONTINUED | OUTPATIENT
Start: 2025-06-10 | End: 2025-06-10 | Stop reason: HOSPADM

## 2025-06-10 RX ORDER — ONDANSETRON 4 MG/1
4 TABLET, ORALLY DISINTEGRATING ORAL ONCE AS NEEDED
Status: DISCONTINUED | OUTPATIENT
Start: 2025-06-10 | End: 2025-06-10 | Stop reason: HOSPADM

## 2025-06-10 RX ORDER — FENTANYL CITRATE 50 UG/ML
50 INJECTION, SOLUTION INTRAMUSCULAR; INTRAVENOUS
Status: DISCONTINUED | OUTPATIENT
Start: 2025-06-10 | End: 2025-06-10 | Stop reason: HOSPADM

## 2025-06-10 RX ORDER — HYDROCODONE BITARTRATE AND ACETAMINOPHEN 10; 325 MG/1; MG/1
1 TABLET ORAL EVERY 4 HOURS PRN
Status: DISCONTINUED | OUTPATIENT
Start: 2025-06-10 | End: 2025-06-10 | Stop reason: HOSPADM

## 2025-06-10 RX ORDER — PROPOFOL 10 MG/ML
INJECTION, EMULSION INTRAVENOUS CONTINUOUS PRN
Status: DISCONTINUED | OUTPATIENT
Start: 2025-06-10 | End: 2025-06-10 | Stop reason: SURG

## 2025-06-10 RX ORDER — HYDROCODONE BITARTRATE AND ACETAMINOPHEN 5; 325 MG/1; MG/1
1 TABLET ORAL ONCE AS NEEDED
Status: DISCONTINUED | OUTPATIENT
Start: 2025-06-10 | End: 2025-06-10 | Stop reason: HOSPADM

## 2025-06-10 RX ORDER — NITROGLYCERIN 0.4 MG/1
0.4 TABLET SUBLINGUAL
COMMUNITY

## 2025-06-10 RX ORDER — SODIUM CHLORIDE 0.9 % (FLUSH) 0.9 %
3 SYRINGE (ML) INJECTION AS NEEDED
Status: DISCONTINUED | OUTPATIENT
Start: 2025-06-10 | End: 2025-06-10 | Stop reason: HOSPADM

## 2025-06-10 RX ADMIN — LIDOCAINE HYDROCHLORIDE 80 MG: 20 INJECTION, SOLUTION EPIDURAL; INFILTRATION; INTRACAUDAL; PERINEURAL at 09:41

## 2025-06-10 RX ADMIN — PROPOFOL 125 MCG/KG/MIN: 10 INJECTION, EMULSION INTRAVENOUS at 09:41

## 2025-06-10 RX ADMIN — HEPARIN SODIUM 3000 UNITS: 1000 INJECTION, SOLUTION INTRAVENOUS; SUBCUTANEOUS at 09:51

## 2025-06-10 RX ADMIN — PROPOFOL 70 MG: 10 INJECTION, EMULSION INTRAVENOUS at 09:41

## 2025-06-10 RX ADMIN — HYDROCODONE BITARTRATE AND ACETAMINOPHEN 1 TABLET: 10; 325 TABLET ORAL at 10:42

## 2025-06-10 RX ADMIN — SODIUM CHLORIDE, POTASSIUM CHLORIDE, SODIUM LACTATE AND CALCIUM CHLORIDE 1000 ML: 600; 310; 30; 20 INJECTION, SOLUTION INTRAVENOUS at 08:14

## 2025-06-10 RX ADMIN — CEFAZOLIN 2000 MG: 2 INJECTION, POWDER, FOR SOLUTION INTRAMUSCULAR; INTRAVENOUS at 09:43

## 2025-06-10 NOTE — OP NOTE
Patient Name: Evangelista  MRN: [id number]  : 1965    PREOPERATIVE DIAGNOSIS: VTE (venous thromboembolism) [I82.90]  Preop testing [Z01.818]     POSTOPERATIVE DIAGNOSIS: Post-Op Diagnosis Codes:     * VTE (venous thromboembolism) [I82.90]     * Preop testing [Z01.818]     PROCEDURE PERFORMED:   Inferior venacavogram  retrieval of IVC filter     SURGEON: Prakash Martinez MD     ANESTHESIA: MAC    PREPARATION: Routine.    STAFF: Circulator: Hannah Herrera RN  Scrub Person: Charlette Lemus  Vascular Radiology Technician: Bárbara Barnett  Other: Lubna Alexandra    Estimated Blood Loss: minimal    SPECIMENS: IVC filter    COMPLICATIONS: None    INDICATIONS: Jerry Bagley is a 59 y.o. male who had an IVC filter placed at an outside institution.  The patient was referred to me for IVC filter retrieval.  Patient was tolerating anticoagulation without issues. The indications, risks, and possible complications of the procedure were explained to the patient, who voiced understanding and wished to proceed with surgery.     PROCEDURE IN DETAIL: The patient was taken to the operating room and placed on the operating table in a supine position. After MAC anesthesia was obtained, the right neck was prepped and draped in a sterile manner.  Local anesthetic was then injected over the right internal jugular vein.  The right internal jugular vein was then accessed with a micropuncture needle under ultrasound guidance.  Micropuncture wire was then advanced with the needle the needle was exchanged for micropuncture sheath.  Patient was systemically heparinized additional bolus given as needed throughout the procedure.  An 035 Glidewire advantage was advanced down into the inferior vena cava under fluoroscopic guidance.  The 11 French IVC retrieval sheath was then advanced down inferior to the IVC filter over the wire.  The venacavogram was then obtained which showed the IVC filter and no evidence of thrombus.  The sheath  was then withdrawn into the more proximal IVC.  The snare was advanced through the sheath and used to grasp the hook of the IVC filter.  The sheath was then advanced over the filter.  The filter was then retrieved through the smaller sheath in its entirety.  A repeat venacavogram was obtained showing complete retrieval of the filter no evidence of injury to the IVC.  All wires catheters and sheaths were then removed.  Pressure was held and hemostasis was obtained.  Sterile dressings were applied. The patient tolerated the procedure well. Sponge and needle counts were correct. The patient was then awakened and extubated in the operating room and taken to the recovery room in good condition.    Rafa Martinez MD  Date: 6/10/2025 Time: 10:08 NURIS

## 2025-06-10 NOTE — DISCHARGE INSTRUCTIONS
No heavy lifting for 2 weeks.  Ok to resume eliquis tomorrow.  Ok to shower tomorrow.  Do not submerge wounds in water for 10 days.  Keep wound covered with a bandaid and change daily.

## 2025-06-10 NOTE — ANESTHESIA PREPROCEDURE EVALUATION
Anesthesia Evaluation                  Airway   Dental      Pulmonary    (+) pulmonary embolism, a smoker Former,  Cardiovascular     PT is on anticoagulation therapy  Patient on routine beta blocker    (+) hypertension, past MI , CAD, cardiac stents , dysrhythmias PVC, CHF , DVT, hyperlipidemia    ROS comment: 2023:  Left ventricular systolic function is normal. Left ventricular ejection fraction appears to be 61 - 65%.  ·  Left ventricular diastolic function is consistent with (grade I) impaired relaxation.  ·  The left atrial cavity is mild to moderately dilated.  ·  Left atrial volume is mildly increased.  ·  The right atrial cavity is mildly  dilated.  ·  Estimated right ventricular systolic pressure from tricuspid regurgitation is mildly elevated (35-45 mmHg).  ·  Mild pulmonary hypertension is present.  ·  Mild dilation of the aortic root is present. Mild dilation of the sinuses of Valsalva is present.      Neuro/Psych  (+) CVA (3/2025:  subarachnoid hemorrhage with small subdural hematoma), syncope  GI/Hepatic/Renal/Endo    (+) GERD, renal disease- stones    Musculoskeletal     Abdominal    Substance History      OB/GYN          Other                          Anesthesia Plan    ASA 3     MAC     ( remote PCI to the LAD but most recent catheterization from January 2022 showing nonobstructive disease and a patent stent.    Reviewed excellent cards note by Dr Vegas. )  intravenous induction     Anesthetic plan, risks, benefits, and alternatives have been provided, discussed and informed consent has been obtained with: patient.        CODE STATUS:

## 2025-06-10 NOTE — ANESTHESIA POSTPROCEDURE EVALUATION
Patient: Jerry Bagley    Procedure Summary       Date: 06/10/25 Room / Location: [facility] OR  / [facility] HYBRID OR    Anesthesia Start: 0938 Anesthesia Stop: 1014    Procedure: VENA CAVA FILTER REMOVAL (Right: Neck) Diagnosis:       VTE (venous thromboembolism)      Preop testing      (VTE (venous thromboembolism) [I82.90])      (Preop testing [Z01.818])    Surgeons: Rafa Martinez MD Provider: Queenie Grimaldo CRNA    Anesthesia Type: MAC ASA Status: 3            Anesthesia Type: MAC    Vitals  Vitals Value Taken Time   /99 06/10/25 10:56   Temp 98 °F (36.7 °C) 06/10/25 10:12   Pulse 50 06/10/25 10:57   Resp 16 06/10/25 10:40   SpO2 100 % 06/10/25 10:57   Vitals shown include unfiled device data.        Post Anesthesia Care and Evaluation    Patient location during evaluation: PACU  Patient participation: complete - patient participated  Level of consciousness: awake and alert  Pain management: adequate    Airway patency: patent  Anesthetic complications: No anesthetic complications  PONV Status: none  Cardiovascular status: acceptable and hemodynamically stable  Respiratory status: acceptable  Hydration status: acceptable    Comments: Blood pressure (!) 164/101, pulse 52, temperature 98 °F (36.7 °C), temperature source Temporal, resp. rate 16, SpO2 100%.    Patient discharged from PACU based upon Eliel score. Please see RN notes for further details     n/a

## 2025-08-29 ENCOUNTER — TELEPHONE (OUTPATIENT)
Dept: VASCULAR SURGERY | Facility: CLINIC | Age: 60
End: 2025-08-29
Payer: COMMERCIAL

## (undated) DEVICE — SOL IRR NACL 0.9PCT BT 1000ML

## (undated) DEVICE — DRSNG SURESITE WNDW 4X4.5

## (undated) DEVICE — IMMOB KN 3PNL DLX CANVS 19IN BLU

## (undated) DEVICE — SYR SLP TP 10ML DISP

## (undated) DEVICE — PK CATH CARD 30 CA/4

## (undated) DEVICE — GAUZE,SPONGE,4"X4",12PLY,STERILE,LF,2'S: Brand: MEDLINE

## (undated) DEVICE — BARR HEMO VASC/PERIF CLOSUR/PAD 4X4CM

## (undated) DEVICE — KT SNAR RETRV 2SHEATH 90D 6F 20MM

## (undated) DEVICE — PINNACLE INTRODUCER SHEATH: Brand: PINNACLE

## (undated) DEVICE — STERILE (14X122CM) TELESCOPICALLY-FOLDED COVER: Brand: CIV-CLEAR™ TRANSDUCER COVER

## (undated) DEVICE — INTENDED FOR TISSUE SEPARATION, AND OTHER PROCEDURES THAT REQUIRE A SHARP SURGICAL BLADE TO PUNCTURE OR CUT.: Brand: BARD-PARKER ®  SAFETY SCALPED

## (undated) DEVICE — PROXIMATE RH ROTATING HEAD SKIN STAPLERS (35 WIDE) CONTAINS 35 STAINLESS STEEL STAPLES: Brand: PROXIMATE

## (undated) DEVICE — CATH DIAG IMPULSE M/ PK 145 5FR

## (undated) DEVICE — SOLIDIFIER LIQUI LOC PLUS 2000CC

## (undated) DEVICE — CANN NASL ETCO2 LO/FLO A/

## (undated) DEVICE — GLV SURG SENSICARE PI ORTHO SZ8 LF STRL

## (undated) DEVICE — PAD, DEFIB, ADULT, RADIOTRANS, PHILIPS: Brand: MEDLINE

## (undated) DEVICE — PAD ENDOVASCULAR: Brand: MEDLINE INDUSTRIES, INC.

## (undated) DEVICE — GW STARTER FXD CORE J .035 3X150CM 3MM

## (undated) DEVICE — MODEL AT P65, P/N 701554-001KIT CONTENTS: HAND CONTROLLER, 3-WAY HIGH-PRESSURE STOPCOCK WITH ROTATING END AND PREMIUM HIGH-PRESSURE TUBING: Brand: ANGIOTOUCH® KIT

## (undated) DEVICE — ST MIC/INTRO ACC SHRP/NDL TUNG/TP NITNL 5F 45CM 7CM

## (undated) DEVICE — MODEL BT2000 P/N 700287-012KIT CONTENTS: MANIFOLD WITH SALINE AND CONTRAST PORTS, SALINE TUBING WITH SPIKE AND HAND SYRINGE, TRANSDUCER: Brand: BT2000 AUTOMATED MANIFOLD KIT

## (undated) DEVICE — RADIFOCUS GLIDEWIRE ADVANTAGE GUIDEWIRE: Brand: GLIDEWIRE ADVANTAGE